# Patient Record
Sex: FEMALE | Race: WHITE | Employment: UNEMPLOYED | URBAN - METROPOLITAN AREA
[De-identification: names, ages, dates, MRNs, and addresses within clinical notes are randomized per-mention and may not be internally consistent; named-entity substitution may affect disease eponyms.]

---

## 2019-01-24 ENCOUNTER — OFFICE VISIT (OUTPATIENT)
Dept: FAMILY MEDICINE CLINIC | Facility: CLINIC | Age: 44
End: 2019-01-24
Payer: COMMERCIAL

## 2019-01-24 ENCOUNTER — APPOINTMENT (OUTPATIENT)
Dept: LAB | Facility: HOSPITAL | Age: 44
End: 2019-01-24
Attending: FAMILY MEDICINE
Payer: COMMERCIAL

## 2019-01-24 VITALS
SYSTOLIC BLOOD PRESSURE: 102 MMHG | DIASTOLIC BLOOD PRESSURE: 70 MMHG | BODY MASS INDEX: 23.38 KG/M2 | OXYGEN SATURATION: 97 % | HEART RATE: 68 BPM | HEIGHT: 71 IN | WEIGHT: 167 LBS

## 2019-01-24 DIAGNOSIS — R79.89 LOW VITAMIN D LEVEL: ICD-10-CM

## 2019-01-24 DIAGNOSIS — R39.15 URINARY URGENCY: ICD-10-CM

## 2019-01-24 DIAGNOSIS — R32 URINARY INCONTINENCE, UNSPECIFIED TYPE: ICD-10-CM

## 2019-01-24 DIAGNOSIS — E53.9 VITAMIN B DEFICIENCY: ICD-10-CM

## 2019-01-24 DIAGNOSIS — Z12.39 BREAST CANCER SCREENING: ICD-10-CM

## 2019-01-24 DIAGNOSIS — N94.3 PMS (PREMENSTRUAL SYNDROME): Primary | ICD-10-CM

## 2019-01-24 DIAGNOSIS — N94.3 PMS (PREMENSTRUAL SYNDROME): ICD-10-CM

## 2019-01-24 LAB
DHEA-S SERPL-MCNC: 473.7 UG/DL (ref 20–266)
VIT B12 SERPL-MCNC: 951 PG/ML (ref 181–914)

## 2019-01-24 PROCEDURE — 82627 DEHYDROEPIANDROSTERONE: CPT | Performed by: FAMILY MEDICINE

## 2019-01-24 PROCEDURE — 84207 ASSAY OF VITAMIN B-6: CPT

## 2019-01-24 PROCEDURE — 99204 OFFICE O/P NEW MOD 45 MIN: CPT | Performed by: FAMILY MEDICINE

## 2019-01-24 PROCEDURE — 86628 CANDIDA ANTIBODY: CPT

## 2019-01-24 PROCEDURE — 82306 VITAMIN D 25 HYDROXY: CPT

## 2019-01-24 PROCEDURE — 84140 ASSAY OF PREGNENOLONE: CPT

## 2019-01-24 PROCEDURE — 82607 VITAMIN B-12: CPT

## 2019-01-24 PROCEDURE — 36415 COLL VENOUS BLD VENIPUNCTURE: CPT

## 2019-01-24 RX ORDER — SPIRONOLACTONE 25 MG/1
TABLET ORAL
COMMUNITY
Start: 2019-01-08 | End: 2019-01-24

## 2019-01-24 RX ORDER — AMOXICILLIN 875 MG/1
TABLET, COATED ORAL
COMMUNITY
Start: 2019-01-16 | End: 2019-01-24

## 2019-01-24 RX ORDER — MIRABEGRON 50 MG/1
TABLET, FILM COATED, EXTENDED RELEASE ORAL
COMMUNITY
Start: 2019-01-13 | End: 2021-09-22

## 2019-01-24 RX ORDER — AZELASTINE HYDROCHLORIDE AND FLUTICASONE PROPIONATE 137; 50 UG/1; UG/1
SPRAY, METERED NASAL
COMMUNITY
Start: 2019-01-02 | End: 2021-07-28

## 2019-01-24 NOTE — PATIENT INSTRUCTIONS
The products and items listed below (the “Products”)  and their claims have not been evaluated by the Food and Drug Administration. Dietary products are not intended to treat, prevent, mitigate or cure disease.  Ultimately, you must draw your own conclusion and nervous system health, including memory. I recommend taking 2000 mg daily.   Some good brands are:  - at [address] stores: [employer] Udo Oil blend (vegan), Garden of Life   - online: [employer]  It can also be found in f

## 2019-01-24 NOTE — PROGRESS NOTES
Indio Walter is a 44year old female. Patient presents with:  Establish Care: supplements       HPI:     Was having very severe PMS symptoms. Was given supplements and spironolactone  Felt like the CBD oil was the most helpful.      Has a chronic h/o po Smokeless tobacco: Never Used    Substance and Sexual Activity      Alcohol use:  Yes        Alcohol/week: 0.6 oz        Types: 1 Glasses of wine per week      Drug use: No      Sexual activity: Not on file    Other Topics      Concerns:        Caffeine 3. Urinary incontinence, unspecified type    4. Breast cancer screening  - [facility] FANG 2D+3D SCREENING BILAT (CPT=77067/84026); Future    5. Low vitamin D level  - VITAMIN D, 25-HYDROXY; Future    6.  Vitamin B deficiency  - VITAMIN B6; Future  - VITAMIN B12; The products and items listed below (the “Products”)  and their claims have not been evaluated by the Food and Drug Administration. Dietary products are not intended to treat, prevent, mitigate or cure disease.  Ultimately, you must draw your own conclusion Omega 3 fatty acids are anti-inflammatory and important for brain and nervous system health, including memory. I recommend taking 2000 mg daily.   Some good brands are:  - at [employer] stores: [employer], [employer]'s Udo Oil blend (vegan), Garden of Life

## 2019-01-25 LAB — 25(OH)D3 SERPL-MCNC: 82.8 NG/ML (ref 30–100)

## 2019-01-26 LAB
CANDIDA ANTIBODY IGA: 0.4 EV
CANDIDA ANTIBODY IGG: 1 EV
CANDIDA ANTIBODY IGM: 0.6 EV

## 2019-01-27 LAB — VITAMIN B6: 381.3 NMOL/L

## 2019-01-29 LAB — PREGNENOLONE BY MS/MS, SERUM: 99 NG/DL

## 2019-04-29 ENCOUNTER — OFFICE VISIT (OUTPATIENT)
Dept: FAMILY MEDICINE CLINIC | Facility: CLINIC | Age: 44
End: 2019-04-29
Payer: COMMERCIAL

## 2019-04-29 VITALS
HEART RATE: 71 BPM | BODY MASS INDEX: 23.52 KG/M2 | OXYGEN SATURATION: 99 % | DIASTOLIC BLOOD PRESSURE: 72 MMHG | SYSTOLIC BLOOD PRESSURE: 118 MMHG | HEIGHT: 71 IN | WEIGHT: 168 LBS

## 2019-04-29 DIAGNOSIS — E53.9 VITAMIN B DEFICIENCY: ICD-10-CM

## 2019-04-29 DIAGNOSIS — R32 URINARY INCONTINENCE, UNSPECIFIED TYPE: ICD-10-CM

## 2019-04-29 DIAGNOSIS — Z00.00 ROUTINE MEDICAL EXAM: Primary | ICD-10-CM

## 2019-04-29 DIAGNOSIS — N94.3 PMS (PREMENSTRUAL SYNDROME): ICD-10-CM

## 2019-04-29 DIAGNOSIS — R79.89 LOW VITAMIN D LEVEL: ICD-10-CM

## 2019-04-29 PROCEDURE — 99396 PREV VISIT EST AGE 40-64: CPT | Performed by: FAMILY MEDICINE

## 2019-04-29 NOTE — PROGRESS NOTES
Jem Morin is a 44year old female. Patient presents with:  Physical: pap and breast exam       HPI:     Doing ok. Still having stress from the move they made. Bought supplements that were previously recommended. Taking CBD oil.    Stopped the spi History reviewed. No pertinent past medical history.     CURRENT MEDICATIONS:     Current Outpatient Medications:  DYMISTA 137-50 MCG/ACT Nasal Suspension  Disp:  Rfl:    MYRBETRIQ 50 MG Oral Tablet 24 Hr  Disp:  Rfl:        SOCIAL HISTORY:   Social History Past Surgical History:   Procedure Laterality Date   • Hip surgery  04/2017    Labral repair - Dr. Martínez Levels   • Knee arthroscopy  1996       PHYSICAL EXAM:      04/29/19  0908   BP: 118/72   Pulse: 71   SpO2: 99%   Weight: 168 lb   Height: 71\" No orders of the defined types were placed in this encounter. Patient Instructions     The products and items listed below (the “Products”)  and their claims have not been evaluated by the Food and Drug Administration.  Dietary products are not intende · Breast cancer screening/ mammograms and clinical breast exams  · Cervical cancer screening/ pap smears  · Healthy diet including adequate intake of vegetables and fruits, appropriate portion sizes, minimizing highly concentrated carbohydrate foods      P

## 2019-06-03 ENCOUNTER — TELEPHONE (OUTPATIENT)
Dept: FAMILY MEDICINE CLINIC | Facility: CLINIC | Age: 44
End: 2019-06-03

## 2019-06-03 DIAGNOSIS — R79.89 LOW VITAMIN D LEVEL: Primary | ICD-10-CM

## 2019-06-03 NOTE — TELEPHONE ENCOUNTER
----- Message from Kaley Jackson. Jimmy Munoz sent at 6/3/2019  1:54 PM CDT -----  Regarding: Referral Request  Contact: [phone]  I'm in need of refurbishing my orthotics I wear for workout.  I called the place that made them and they told me to go thru insurance

## 2019-06-04 NOTE — TELEPHONE ENCOUNTER
LM for pt that referral is ready and can be mailed, faxed or picked up by pt. Encouraged pt to contact the office to let us know.

## 2019-08-27 ENCOUNTER — HOSPITAL ENCOUNTER (OUTPATIENT)
Dept: MAMMOGRAPHY | Facility: HOSPITAL | Age: 44
Discharge: HOME | End: 2019-08-27
Attending: FAMILY MEDICINE
Payer: COMMERCIAL

## 2019-08-27 DIAGNOSIS — Z12.39 BREAST CANCER SCREENING: ICD-10-CM

## 2019-08-27 PROCEDURE — 77067 SCR MAMMO BI INCL CAD: CPT | Performed by: FAMILY MEDICINE

## 2019-08-27 PROCEDURE — 77063 BREAST TOMOSYNTHESIS BI: CPT | Performed by: FAMILY MEDICINE

## 2019-12-03 ENCOUNTER — OFFICE VISIT (OUTPATIENT)
Dept: INTEGRATIVE MEDICINE | Facility: CLINIC | Age: 44
End: 2019-12-03
Payer: COMMERCIAL

## 2019-12-03 ENCOUNTER — TELEPHONE (OUTPATIENT)
Dept: INTEGRATIVE MEDICINE | Facility: CLINIC | Age: 44
End: 2019-12-03

## 2019-12-03 VITALS
BODY MASS INDEX: 19.71 KG/M2 | HEIGHT: 71 IN | OXYGEN SATURATION: 91 % | SYSTOLIC BLOOD PRESSURE: 122 MMHG | WEIGHT: 140.81 LBS | HEART RATE: 73 BPM | DIASTOLIC BLOOD PRESSURE: 84 MMHG | TEMPERATURE: 98 F

## 2019-12-03 DIAGNOSIS — R35.0 URINARY FREQUENCY: ICD-10-CM

## 2019-12-03 DIAGNOSIS — R30.9 URINARY PAIN: Primary | ICD-10-CM

## 2019-12-03 DIAGNOSIS — M79.602 PAIN OF LEFT UPPER EXTREMITY: ICD-10-CM

## 2019-12-03 DIAGNOSIS — R30.9 URINARY PAIN: ICD-10-CM

## 2019-12-03 PROCEDURE — 87086 URINE CULTURE/COLONY COUNT: CPT | Performed by: FAMILY MEDICINE

## 2019-12-03 PROCEDURE — 99213 OFFICE O/P EST LOW 20 MIN: CPT | Performed by: PHYSICIAN ASSISTANT

## 2019-12-03 PROCEDURE — 87186 SC STD MICRODIL/AGAR DIL: CPT | Performed by: FAMILY MEDICINE

## 2019-12-03 PROCEDURE — 87077 CULTURE AEROBIC IDENTIFY: CPT | Performed by: FAMILY MEDICINE

## 2019-12-03 PROCEDURE — 81002 URINALYSIS NONAUTO W/O SCOPE: CPT | Performed by: PHYSICIAN ASSISTANT

## 2019-12-03 RX ORDER — NITROFURANTOIN 25; 75 MG/1; MG/1
100 CAPSULE ORAL 2 TIMES DAILY
Qty: 16 CAPSULE | Refills: 0 | Status: SHIPPED | OUTPATIENT
Start: 2019-12-03 | End: 2019-12-03

## 2019-12-03 RX ORDER — NITROFURANTOIN 25; 75 MG/1; MG/1
100 CAPSULE ORAL 2 TIMES DAILY
Qty: 16 CAPSULE | Refills: 0 | Status: SHIPPED | OUTPATIENT
Start: 2019-12-03 | End: 2019-12-11

## 2019-12-03 NOTE — TELEPHONE ENCOUNTER
Please call in prescription for patient's UTI to [facility] #[phone] per patient request - Their current system is down and the patient's prescription will not be ready for several hours.  Thank you

## 2019-12-03 NOTE — PATIENT INSTRUCTIONS
Find at [employer]   Take according to bottle instructions. Drinks lots of water. Take 1 capsule twice a day while on antibiotics to help prevent side effects. Find at pharmacy.

## 2019-12-03 NOTE — PROGRESS NOTES
Ca Trevino is a 38year old female. Patient presents with:  Abdominal Pain: lower abdominal pain   Urinary Symptoms: blood in urine, frequent urination       HPI:   crampy and pressure. Period ended yesterday. Urine is pink. Painful.  Never had a UTI Socioeconomic History      Marital status:       Spouse name: Not on file      Number of children: Not on file      Years of education: Not on file      Highest education level: Not on file    Occupational History      Not on file    Social Needs Weight: 140 lb 12.8 oz (63.9 kg)   Height: 71\"       Physical Exam   Constitutional: She is oriented to person, place, and time and well-developed, well-nourished, and in no distress. Eyes: Pupils are equal, round, and reactive to light.  Conjunctivae ar Orders Placed This Encounter      POC Urinalysis, Manual Dip without microscopy [[id number]]      Urinalysis with Culture Reflex [E]      Patient Instructions         Find at [employer]   Take according to bottle instructions. Drinks lots of water.

## 2019-12-04 ENCOUNTER — TELEPHONE (OUTPATIENT)
Dept: INTEGRATIVE MEDICINE | Facility: CLINIC | Age: 44
End: 2019-12-04

## 2019-12-04 DIAGNOSIS — R35.0 URINARY FREQUENCY: Primary | ICD-10-CM

## 2019-12-09 NOTE — PROGRESS NOTES
Yuan Roberto,    The urine culture came back positive for infection with E. Coli. The antibiotic that I gave you should work for this.      Frankey Sickle, PA-C

## 2019-12-19 ENCOUNTER — OFFICE VISIT (OUTPATIENT)
Dept: INTEGRATIVE MEDICINE | Facility: CLINIC | Age: 44
End: 2019-12-19
Payer: COMMERCIAL

## 2019-12-19 VITALS
DIASTOLIC BLOOD PRESSURE: 84 MMHG | BODY MASS INDEX: 20 KG/M2 | HEIGHT: 71 IN | SYSTOLIC BLOOD PRESSURE: 122 MMHG | HEART RATE: 85 BPM | TEMPERATURE: 98 F | OXYGEN SATURATION: 98 %

## 2019-12-19 DIAGNOSIS — N89.8 VAGINAL DISCHARGE: Primary | ICD-10-CM

## 2019-12-19 DIAGNOSIS — R30.9 PAINFUL URINATION: ICD-10-CM

## 2019-12-19 DIAGNOSIS — R10.2 VAGINAL PAIN: ICD-10-CM

## 2019-12-19 PROCEDURE — 87205 SMEAR GRAM STAIN: CPT | Performed by: PHYSICIAN ASSISTANT

## 2019-12-19 PROCEDURE — 87106 FUNGI IDENTIFICATION YEAST: CPT | Performed by: PHYSICIAN ASSISTANT

## 2019-12-19 PROCEDURE — 87808 TRICHOMONAS ASSAY W/OPTIC: CPT | Performed by: PHYSICIAN ASSISTANT

## 2019-12-19 PROCEDURE — 81002 URINALYSIS NONAUTO W/O SCOPE: CPT | Performed by: PHYSICIAN ASSISTANT

## 2019-12-19 PROCEDURE — 99213 OFFICE O/P EST LOW 20 MIN: CPT | Performed by: PHYSICIAN ASSISTANT

## 2019-12-19 NOTE — PROGRESS NOTES
Bradley Ramirez is a 38year old female. Patient presents with: Follow - Up: uti f/u ,vaginal exam   Cramps: x 3 days    Vaginal Problem: vaginal pressure       HPI:   Feels crampy and has vaginal pressure. Some tingling. Has had BV before.  A little vag Social Needs      Financial resource strain: Not on file      Food insecurity:        Worry: Not on file        Inability: Not on file      Transportation needs:        Medical: Not on file        Non-medical: Not on file    Tobacco Use      Smoking status Pulmonary/Chest: Effort normal and breath sounds normal.   Abdominal: Soft. Bowel sounds are normal. There is no tenderness. There is no guarding. Genitourinary:    Cervix normal.      Vaginal discharge present.      Neurological: She is alert and orien

## 2019-12-22 NOTE — PROGRESS NOTES
Hi Kayleen Dose,     Your vaginosis screen is not completely back yet but so far it is looking like it is negative and no treatment is needed. Let us know if you at still having issues.      Lee Ann Musa PA-C

## 2020-03-17 ENCOUNTER — PATIENT MESSAGE (OUTPATIENT)
Dept: INTEGRATIVE MEDICINE | Facility: CLINIC | Age: 45
End: 2020-03-17

## 2020-03-17 RX ORDER — AMOXICILLIN AND CLAVULANATE POTASSIUM 875; 125 MG/1; MG/1
1 TABLET, FILM COATED ORAL 2 TIMES DAILY
Qty: 14 TABLET | Refills: 0 | Status: SHIPPED | OUTPATIENT
Start: 2020-03-17 | End: 2020-03-24

## 2020-03-17 NOTE — TELEPHONE ENCOUNTER
From: Shira Horne  To: Ezekiel Richardson DO  Sent: 3/17/2020 10:58 AM CDT  Subject: Non-Urgent Medical Question    Good morning,   I'm having a lot of pressure in my face/teeth from a sinus infection I seem to get once or twice a year.  Would it be possible

## 2020-10-30 ENCOUNTER — PATIENT MESSAGE (OUTPATIENT)
Dept: INTEGRATIVE MEDICINE | Facility: CLINIC | Age: 45
End: 2020-10-30

## 2020-10-30 DIAGNOSIS — Z12.31 ENCOUNTER FOR SCREENING MAMMOGRAM FOR MALIGNANT NEOPLASM OF BREAST: Primary | ICD-10-CM

## 2020-10-30 NOTE — TELEPHONE ENCOUNTER
From: Tiffany Molina  To: Maciel Coffman DO  Sent: 10/30/2020 12:58 PM CDT  Subject: Non-Urgent Medical Question    I booked my mammogram online with [address]. They just called because I don't have an order.  I thought a law was passed that women can

## 2020-10-30 NOTE — TELEPHONE ENCOUNTER
RN has pended mammo for patient who has scheduled, last order was  and was asking for additional views, I did not know how to add that in, please check my pended order. Thank you.

## 2020-12-01 ENCOUNTER — HOSPITAL ENCOUNTER (OUTPATIENT)
Dept: MAMMOGRAPHY | Facility: HOSPITAL | Age: 45
Discharge: HOME | End: 2020-12-01
Attending: FAMILY MEDICINE
Payer: COMMERCIAL

## 2020-12-01 DIAGNOSIS — Z12.31 ENCOUNTER FOR SCREENING MAMMOGRAM FOR MALIGNANT NEOPLASM OF BREAST: ICD-10-CM

## 2020-12-01 PROCEDURE — 77067 SCR MAMMO BI INCL CAD: CPT | Performed by: FAMILY MEDICINE

## 2020-12-01 PROCEDURE — 77063 BREAST TOMOSYNTHESIS BI: CPT | Performed by: FAMILY MEDICINE

## 2021-01-25 ENCOUNTER — TELEMEDICINE (OUTPATIENT)
Dept: FAMILY MEDICINE CLINIC | Facility: CLINIC | Age: 46
End: 2021-01-25

## 2021-01-25 DIAGNOSIS — Z20.822 EXPOSURE TO COVID-19 VIRUS: Primary | ICD-10-CM

## 2021-01-25 PROCEDURE — 99213 OFFICE O/P EST LOW 20 MIN: CPT | Performed by: NURSE PRACTITIONER

## 2021-01-25 NOTE — PROGRESS NOTES
Due to the real risk of possible exposure to Coronavirus (CoV-2, COVID-19) in the office/medical building and recommendations for social distancing (key to mitigation/limiting the spread of the virus) a Virtual or Telemedicine visit over the phone was perf above.  Coding/billing information is submitted for this visit based on complexity of care and/or time spent for the visit. HPI:  Patient presents with:   Follow - Up: COVID exposure, negative test      Bradley Ramirez is a 41year old female who calls fo COVID PCR 1/23/21. Additional Assessment and Plan:  1.  Exposure to COVID-19 virus  -Discussed CDC quarantine guidance with patient, which was updated 12/2020 to reflect that if a patient has been monitoring for symptoms daily and has had a negative PCR

## 2021-01-25 NOTE — PATIENT INSTRUCTIONS
Coronavirus Disease 2019 (COVID-19)     [facility] is committed to the safety and well-being of our patients, members, employees, and communities.  As concerns arise about the new strain of coronavirus that causes COVID-19, [facility] exposure  • After day 7 from date of last exposure with a negative test result (test must occur on day 5 or later)  After stopping quarantine, you should  • Watch for symptoms until 14 days after exposure.   • If you have symptoms, immediately self-isolate Care     If you are awaiting test results or are confirmed positive for COVID -19, and your symptoms worsen at home with symptoms such as: extreme weakness, difficult breathing, or unrelenting fevers greater than 100.4 degrees Fahrenheit, you should contac Follow-up  Please call your primary care provider within 2 days of your discharge to arrange for a telehealth follow-up.  CDC does not recommend repeat testing after a positive test.  Convalescent Plasma Donation Program  Zohra Salter, in conjunct [email or web address]. pdf  [email or web address]  [email or web address]

## 2021-08-02 ENCOUNTER — MED REC SCAN ONLY (OUTPATIENT)
Dept: INTEGRATIVE MEDICINE | Facility: CLINIC | Age: 46
End: 2021-08-02

## 2021-08-24 NOTE — TELEPHONE ENCOUNTER
A refill request was received for:  Requested Prescriptions     Pending Prescriptions Disp Refills   • AZELASTINE-FLUTICASONE 137-50 MCG/ACT Nasal Suspension [Pharmacy Med Name: AZELASTIN-FLUTIC 137-50MCG [facility]]  0     Si SPRAY BY NASAL ROUTE 2 (TWO) BEATRIZ

## 2021-08-25 RX ORDER — AZELASTINE HYDROCHLORIDE, FLUTICASONE PROPIONATE 137; 50 UG/1; UG/1
1 SPRAY, METERED NASAL 2 TIMES DAILY
Qty: 23 G | Refills: 1 | Status: SHIPPED | OUTPATIENT
Start: 2021-08-25 | End: 2021-11-29

## 2021-09-22 ENCOUNTER — LAB ENCOUNTER (OUTPATIENT)
Dept: LAB | Facility: HOSPITAL | Age: 46
End: 2021-09-22
Attending: CLINICAL NURSE SPECIALIST
Payer: COMMERCIAL

## 2021-09-22 ENCOUNTER — OFFICE VISIT (OUTPATIENT)
Dept: OBGYN CLINIC | Facility: CLINIC | Age: 46
End: 2021-09-22
Payer: COMMERCIAL

## 2021-09-22 VITALS
SYSTOLIC BLOOD PRESSURE: 120 MMHG | WEIGHT: 180 LBS | HEART RATE: 67 BPM | DIASTOLIC BLOOD PRESSURE: 77 MMHG | BODY MASS INDEX: 25 KG/M2

## 2021-09-22 DIAGNOSIS — Z30.09 ENCOUNTER FOR COUNSELING REGARDING CONTRACEPTION: ICD-10-CM

## 2021-09-22 DIAGNOSIS — N92.0 MENORRHAGIA WITH REGULAR CYCLE: Primary | ICD-10-CM

## 2021-09-22 LAB
BASOPHILS # BLD AUTO: 0.04 X10(3) UL (ref 0–0.2)
BASOPHILS NFR BLD AUTO: 0.9 %
DEPRECATED RDW RBC AUTO: 38.8 FL (ref 35.1–46.3)
EOSINOPHIL # BLD AUTO: 0.08 X10(3) UL (ref 0–0.7)
EOSINOPHIL NFR BLD AUTO: 1.7 %
ERYTHROCYTE [DISTWIDTH] IN BLOOD BY AUTOMATED COUNT: 11.4 % (ref 11–15)
HCT VFR BLD AUTO: 37.5 %
HGB BLD-MCNC: 13.3 G/DL
IMM GRANULOCYTES # BLD AUTO: 0.03 X10(3) UL (ref 0–1)
IMM GRANULOCYTES NFR BLD: 0.6 %
LYMPHOCYTES # BLD AUTO: 1.45 X10(3) UL (ref 1–4)
LYMPHOCYTES NFR BLD AUTO: 31 %
MCH RBC QN AUTO: 32.7 PG (ref 26–34)
MCHC RBC AUTO-ENTMCNC: 35.5 G/DL (ref 31–37)
MCV RBC AUTO: 92.1 FL
MONOCYTES # BLD AUTO: 0.32 X10(3) UL (ref 0.1–1)
MONOCYTES NFR BLD AUTO: 6.8 %
NEUTROPHILS # BLD AUTO: 2.76 X10 (3) UL (ref 1.5–7.7)
NEUTROPHILS # BLD AUTO: 2.76 X10(3) UL (ref 1.5–7.7)
NEUTROPHILS NFR BLD AUTO: 59 %
PLATELET # BLD AUTO: 245 10(3)UL (ref 150–450)
RBC # BLD AUTO: 4.07 X10(6)UL
WBC # BLD AUTO: 4.7 X10(3) UL (ref 4–11)

## 2021-09-22 PROCEDURE — 85025 COMPLETE CBC W/AUTO DIFF WBC: CPT | Performed by: CLINICAL NURSE SPECIALIST

## 2021-09-22 PROCEDURE — 3078F DIAST BP <80 MM HG: CPT | Performed by: CLINICAL NURSE SPECIALIST

## 2021-09-22 PROCEDURE — 3074F SYST BP LT 130 MM HG: CPT | Performed by: CLINICAL NURSE SPECIALIST

## 2021-09-22 PROCEDURE — 99203 OFFICE O/P NEW LOW 30 MIN: CPT | Performed by: CLINICAL NURSE SPECIALIST

## 2021-09-22 PROCEDURE — 36415 COLL VENOUS BLD VENIPUNCTURE: CPT | Performed by: CLINICAL NURSE SPECIALIST

## 2021-09-23 NOTE — PROGRESS NOTES
Alba Potter is a 40year old female [id number] Patient's last menstrual period was 09/12/2021. Patient presents with:  Consult: Would like to discuss IUD for heavy menses   New pt. Recently moved back after 3 years away.  Last pap and annual exam was about Types: 1 Glasses of wine per week    Drug use: No         SOCIAL HISTORY:  Social History    Socioeconomic History      Marital status:       Spouse name: Not on file      Number of children: Not on file      Years of education: Not on file      Hi   Emotionally Abused: Not on file      Physically Abused: Not on file      Sexually Abused: Not on file  Housing Stability:       Unable to Pay for Housing in the Last Year: Not on file      Number of Places Lived in the Last Year: Not on file      Unstabl infection, expulsion and ectopic) as well as side effects (irregular bleeding for the first few months, followed by possibility of irregular but very light spotting or amenorrhea). After US, pt will call with period to schedule insertion.  Rx for Cytotec

## 2021-10-28 ENCOUNTER — MED REC SCAN ONLY (OUTPATIENT)
Dept: INTEGRATIVE MEDICINE | Facility: CLINIC | Age: 46
End: 2021-10-28

## 2021-11-08 ENCOUNTER — OFFICE VISIT (OUTPATIENT)
Dept: INTEGRATIVE MEDICINE | Facility: CLINIC | Age: 46
End: 2021-11-08
Payer: COMMERCIAL

## 2021-11-08 VITALS
HEART RATE: 66 BPM | DIASTOLIC BLOOD PRESSURE: 70 MMHG | BODY MASS INDEX: 25 KG/M2 | HEIGHT: 71 IN | SYSTOLIC BLOOD PRESSURE: 108 MMHG | OXYGEN SATURATION: 96 %

## 2021-11-08 DIAGNOSIS — R35.0 URINARY FREQUENCY: Primary | ICD-10-CM

## 2021-11-08 DIAGNOSIS — Z12.11 COLON CANCER SCREENING: ICD-10-CM

## 2021-11-08 DIAGNOSIS — Z12.31 BREAST CANCER SCREENING BY MAMMOGRAM: ICD-10-CM

## 2021-11-08 PROCEDURE — 3078F DIAST BP <80 MM HG: CPT | Performed by: FAMILY MEDICINE

## 2021-11-08 PROCEDURE — 3074F SYST BP LT 130 MM HG: CPT | Performed by: FAMILY MEDICINE

## 2021-11-08 PROCEDURE — 3008F BODY MASS INDEX DOCD: CPT | Performed by: FAMILY MEDICINE

## 2021-11-08 PROCEDURE — 99214 OFFICE O/P EST MOD 30 MIN: CPT | Performed by: FAMILY MEDICINE

## 2021-11-08 NOTE — PROGRESS NOTES
Christofer Leo is a 40year old female. Patient presents with: Follow - Up: pt had physical elsewhere in march   Breast Follow-up: breast exam       HPI:     Received COVID vaccine in March 2021. Then had several heavy periods.      Still urinating quite Stress Concern: Not Asked        Weight Concern: Not Asked    Social History Narrative      Not on file    Social Determinants of Health  Financial Resource Strain:       Difficulty of Paying Living Expenses: Not on file  Food Insecurity:       Worried Ab Cardiovascular:      Rate and Rhythm: Normal rate. Pulmonary:      Effort: Pulmonary effort is normal.   Musculoskeletal:      Cervical back: Neck supple. Skin:     General: Skin is warm and dry.    Neurological:      Mental Status: She is alert and o Colon cancer screening    Labs from executive physical reviewed. Cologuard ordered for colon cancer screening. Patient given mammogram order to screen for breast cancer. Patient was referred for pelvic floor therapy for further management of symptoms. each to a liter bottle of water to be sipped throughout the day on Days 4-7. Continue taking until all the bottles are complete. Where to Find: ONLY online at [email or web address]. [employer]        Return if symptoms worsen or fail to improve.     Glo

## 2021-11-08 NOTE — PATIENT INSTRUCTIONS
I have complete keke in the body's ability to heal and transform. The products and items listed below (the “Products”)  and their claims have not been evaluated by the Food and Drug Administration.  Dietary products are not intended to treat, prevent, m

## 2021-11-09 ENCOUNTER — TELEPHONE (OUTPATIENT)
Dept: INTEGRATIVE MEDICINE | Facility: CLINIC | Age: 46
End: 2021-11-09

## 2021-11-09 NOTE — TELEPHONE ENCOUNTER
----- Message from Hammad Manzo DO sent at 11/8/2021  1:58 PM CST -----  Regarding: Cologuard  Please order Cologuard for patient.

## 2021-11-23 LAB — AMB EXT COLOGUARD RESULT: NEGATIVE

## 2021-11-29 RX ORDER — AZELASTINE HYDROCHLORIDE, FLUTICASONE PROPIONATE 137; 50 UG/1; UG/1
1 SPRAY, METERED NASAL 2 TIMES DAILY
Qty: 23 G | Refills: 1 | Status: SHIPPED | OUTPATIENT
Start: 2021-11-29 | End: 2021-12-06

## 2021-11-29 NOTE — TELEPHONE ENCOUNTER
A refill request was received for:  Requested Prescriptions     Pending Prescriptions Disp Refills   • AZELASTINE-FLUTICASONE 137-50 MCG/ACT Nasal Suspension [Pharmacy Med Name: AZELASTIN-FLUTIC 137-50MCG [facility]]  1     Si SPRAY BY NASAL ROUTE 2 (TWO) BEATRIZ

## 2021-12-08 NOTE — TELEPHONE ENCOUNTER
Rcvd results from [facility] for pt's Cologuard testing: Negative  Report reviewed with Dr. Anu Jasso. Results entered into External Result Console. Sent to scanning. Pt notified via [address]

## 2022-01-19 ENCOUNTER — HOSPITAL ENCOUNTER (OUTPATIENT)
Dept: MAMMOGRAPHY | Facility: HOSPITAL | Age: 47
Discharge: HOME | End: 2022-01-19
Attending: FAMILY MEDICINE
Payer: COMMERCIAL

## 2022-01-19 DIAGNOSIS — Z12.31 BREAST CANCER SCREENING BY MAMMOGRAM: ICD-10-CM

## 2022-01-19 PROCEDURE — 77063 BREAST TOMOSYNTHESIS BI: CPT | Performed by: FAMILY MEDICINE

## 2022-01-19 PROCEDURE — 77067 SCR MAMMO BI INCL CAD: CPT | Performed by: FAMILY MEDICINE

## 2022-03-10 ENCOUNTER — APPOINTMENT (OUTPATIENT)
Dept: PHYSICAL THERAPY | Age: 47
End: 2022-03-10
Attending: FAMILY MEDICINE
Payer: COMMERCIAL

## 2022-03-14 ENCOUNTER — TELEPHONE (OUTPATIENT)
Dept: PHYSICAL THERAPY | Facility: HOSPITAL | Age: 47
End: 2022-03-14

## 2022-03-16 ENCOUNTER — OFFICE VISIT (OUTPATIENT)
Dept: PHYSICAL THERAPY | Age: 47
End: 2022-03-16
Attending: FAMILY MEDICINE
Payer: COMMERCIAL

## 2022-03-16 DIAGNOSIS — R35.0 URINARY FREQUENCY: ICD-10-CM

## 2022-03-16 PROCEDURE — 97112 NEUROMUSCULAR REEDUCATION: CPT

## 2022-03-16 PROCEDURE — 97110 THERAPEUTIC EXERCISES: CPT

## 2022-03-16 PROCEDURE — 97161 PT EVAL LOW COMPLEX 20 MIN: CPT

## 2022-03-16 NOTE — PROGRESS NOTES
PELVIC FLOOR EVALUATION:   Referring Physician: Dr. Joey Davison  Diagnosis:    Urinary frequency (R35.0)  Date of onset:  chronic Date of Service: 3/16/2022     PATIENT SUMMARY   Iwona Aragon is a 40year old female  who presents to therapy today with complaints of urgency and leakage. Current symptoms include: urgency/frequency and leakage  History of condition: Patient reports she has had bladder issues her whole life. She was a \"bed wetter\" until she was 16 y/o. She delivered her first child 19 years ago. Afterwards she had more urinary issues and saw a pelvic therapist. It helped her symptoms. She had 2 more children. She also had torn hip labrum and repairs about 5 years ago. She is able to jump, run, cough, and sneeze without leakage. She c/o urgency and has to rush to the washroom (mostly when walking dog). She started therapy about 6 months prior. She is finding that her bladder control is getting better. She had to travel to [facility] and was able to hold her bladder for 5 hours. She leaks about once every couple of weeks. Nocturia of 3x per night. She feels like she wakes more to habit. She denies any bowel issues. She does have R hip pain which she is currently being treated for. Obstetrical/Gynecological history:  Pregnant Now: No   3 /Para 3 (66,70,46)  Complications during pregnancy/delivery: Vaginal   Last menstrual period: one week prior     Surgical history:R hip labral repairs, L knee surgery  Urodynamic Test: no  Occupation/Activities: not working. PFDI 20    Scores   POPDI 6:   [id number]    CRAD 8:   6.25    CHONG 6:   37.5    Summary:   [id number]         Mely Tyler describes prior level of function: no limitations  Pt goals include improve bladder control . Past medical history was reviewed with Mely Tyler.  Significant findings include: None    Precautions:  None    URINARY HABITS  Types of symptoms: urge incontinence and nocturia  Events associated with the onset of urinary complaints: delivery   Abdominal/Vaginal Pressure complaints: no  Urinary Frequency:every 3 hours  Urine Stream: normal  Amount: normal   Leaking occurs:  urge  Episodes of Leakage: 1x every 2-3 weeks  Amount of leakage: gush  Pad use: no  Pad Change frequency: na  Nocturia: 3x  Fluid Intake: coffee (1/2 pot), water: 4 x 8oz bottles, no alcohol. Bladder irritants: coffee  Urine Stop test: yes  Post void dribble: No  Hovering: No  Empty bladder just in case: Yes  Do you ever leak urine without knowing it? No    BOWEL HABITS  Types of symptoms: none   Frequency of bowel movements: daily   Stool consistency: Castro Stool Scale: 4  Do you strain with defecation: No   Laxative/Stool softener use: No    SEXUAL HEALTH STATUS  History of Sexual Abuse: no  Sexual Aneth Status: participating  Pain with initial and/or deep penetration: no/no  Pain with pelvic exam/tampon use: no/No  Orgasm status: gayle Snyder presents to physical therapy evaluation with primary c/o urgency and leakage of urine. Patient reports long standing history of urgency. She has had PT in the past with good results. Symptoms increased about 5 years prior. She also has PMH of R labral tear and repair. She is currently in PT for hip pain which has also helped her bladder control. She denies any bowel issues. She denies any pelvic pain. The results of the objective tests and measures show TTP in layer 2,3 of R PF,  Decreased relaxation of PF, decreased ROM in PF, and decreased strength and endurance in PF. Functional deficits include but are not limited to urgency and leakage of urine. Signs and symptoms are consistent with diagnosis of urinary frequency. Pt and PT discussed evaluation findings, pathology, POC and HEP. Pt voiced understanding and performs HEP correctly without reported pain. Skilled Pelvic Physical Therapy is medically necessary to address the above impairments and reach functional goals.     OBJECTIVE: Range Of Motion  Lumbar AROM screen: WFL  LE AROM screen: grossly WNL except: WFL    Strength (MMT) 5/5 STEPHANIE LE except hip ext 4/5  Transverse Abdominis: 4/5    Flexibility Summary: WNL STEPHANIE LE except hamstring, piriformis, OI    Special Tests: Pelvis WFL    Functional screen:  Double leg squat:WFL  Single leg squat:WFL  Single leg stance: [facility]    Informed consent for internal pelvic evaluation given: Yes    Internal Examination   Mons pubis: WNL  Labia majora: WNL  Labia minora: WNL  Urethral meatus: WNL  Introitus: WNL  Perineal body: WNL    Internal Palpation Left Right Comments   Superficial Transverse perineal moderate restriction moderate restriction and tenderness    Bulbocavernosus moderate restriction moderate restriction and tenderness    Ischiocavernosus moderate restriction moderate restriction and tenderness    Deep Transverse Perineal moderate restriction moderate restriction and tenderness    Compress Urethrae/Spinc. Urethrovaginalis   moderate restriction moderate restriction and tenderness    Pubococcygeus/Pubovaginalis moderate restriction and tenderness moderate restriction and tenderness    Iliococcygeus moderate restriction and tenderness moderate restriction and tenderness    Coccygeus moderate restriction and pain moderate restriction and pain    Piriformis not tested not tested    Obturator internus moderate restriction and tenderness moderate restriction and pain         Pelvic Floor Muscle strength: (PERF= Power/Endurance/Reps/Fast) MMT: 3/3/4/7  External Anal Sphincter: WFL  Accessory Muscle Use: gluteals    Diastasis Recti: not tested    Tissue Laxity Test:  Anterior Wall: Min  Posterior Wall: Min  Apical: Min      Today's Treatment and Response:   Patient education was provided on objective findings of external and internal evaluation and expectations with treatment outcomes.  Educated on pelvic anatomy and function with diagrams and pelvic model, bladder normatives, adequate hydration levels, proper toileting posture, stress/urge urinary incontinence strategies and diaphragmatic breathing for PNS activation and pelvic floor relaxation     Patient was instructed in and issued a HEP for: diaphragmatic breathing, happy baby, urge inhibition    Charges: PT Andrade Low Complexity, 1TE, 1NM      Total Timed Treatment: 30 min     Total Treatment Time: 45 min     Based on clinical rationale and outcome measures, this evaluation involved Low Complexity decision making   PLAN OF CARE:    Goals: (to be met in 10 visits)  Patient to report urinary frequency to every 2-4 hours to allow for independent ADLs  Patient instructed on Levator Ani contraction inverse to diaphragmatic breathing to allow for pelvic brace with ADLs without valsalva. Patient exhibits an increase in pelvic floor strength from 3/5 with 3 count hold to 4/5  with 10 count hold to allow for pelvic brace with ADLs. Patient reports a reduction in UUI from 1x/ week to 0x week     Patient understands importance of performing HEP to prevent reoccurrence of symptoms. Frequency / Duration: Patient will be seen for 1 x/week or a total of 10 visits over a 90 day period. Treatment will include: Manual Therapy, Neuromuscular Re-education, Self-Care Home Management, Therapeutic Activities, Therapeutic Exercise and Home Exercise Program instruction     Education or treatment limitation: None  Rehab Potential:good      Patient/Family/Caregiver was advised of these findings, precautions, and treatment options and has agreed to actively participate in planning and for this course of care. Thank you for your referral. Please co-sign or sign and return this letter via fax as soon as possible to [phone].  If you have any questions, please contact me at Dept: [phone]    Sincerely,  Electronically signed by therapist: Chris Robert, PT  [de-identified] certification required: Yes  I certify the need for these services furnished under this plan of treatment and while under my care.     X___________________________________________________ Date____________________    Certification From: 5/80/5274  To:6/14/2022

## 2022-03-22 NOTE — TELEPHONE ENCOUNTER
Received fax from pt's pharmacy, [facility], requesting a 90-day supply of Flonase - last Rx refill was 03/08/22. Pt informed via [employer] she needs to schedule her annual phx prior to refill.

## 2022-03-23 ENCOUNTER — OFFICE VISIT (OUTPATIENT)
Dept: PHYSICAL THERAPY | Age: 47
End: 2022-03-23
Attending: FAMILY MEDICINE
Payer: COMMERCIAL

## 2022-03-23 PROCEDURE — 97112 NEUROMUSCULAR REEDUCATION: CPT

## 2022-03-23 PROCEDURE — 97140 MANUAL THERAPY 1/> REGIONS: CPT

## 2022-03-24 RX ORDER — FLUTICASONE PROPIONATE 50 MCG
2 SPRAY, SUSPENSION (ML) NASAL DAILY
Qty: 48 G | Refills: 1 | Status: SHIPPED | OUTPATIENT
Start: 2022-03-24 | End: 2023-03-19

## 2022-04-04 ENCOUNTER — PATIENT MESSAGE (OUTPATIENT)
Dept: OBGYN CLINIC | Facility: CLINIC | Age: 47
End: 2022-04-04

## 2022-04-04 NOTE — TELEPHONE ENCOUNTER
From: Ottoniel Hook  To: JOSE Nieto  Sent: 2022 2:36 PM CDT  Subject: Wednesday cancel    Era Oly, I have a  to attend Wednesday and will miss our appt. I will see you the following week. Thank you.

## 2022-04-06 ENCOUNTER — APPOINTMENT (OUTPATIENT)
Dept: PHYSICAL THERAPY | Age: 47
End: 2022-04-06
Attending: FAMILY MEDICINE
Payer: COMMERCIAL

## 2022-04-13 ENCOUNTER — APPOINTMENT (OUTPATIENT)
Dept: PHYSICAL THERAPY | Age: 47
End: 2022-04-13
Attending: FAMILY MEDICINE
Payer: COMMERCIAL

## 2022-04-14 RX ORDER — AZELASTINE 1 MG/ML
SPRAY, METERED NASAL
Qty: 30 ML | Refills: 1 | Status: SHIPPED | OUTPATIENT
Start: 2022-04-14

## 2022-04-18 ENCOUNTER — TELEPHONE (OUTPATIENT)
Dept: INTEGRATIVE MEDICINE | Facility: CLINIC | Age: 47
End: 2022-04-18

## 2022-04-18 NOTE — TELEPHONE ENCOUNTER
Received fax from pt's pharmacy stating that Azelastine 0.1% is unavailable/back ordered - OK for pharmacy to change to Azelastine 0.15% Nasal Spray?     LOV 11.08.21  Upcoming visit 05.26.22

## 2022-04-20 ENCOUNTER — APPOINTMENT (OUTPATIENT)
Dept: PHYSICAL THERAPY | Age: 47
End: 2022-04-20
Attending: FAMILY MEDICINE
Payer: COMMERCIAL

## 2022-04-27 ENCOUNTER — APPOINTMENT (OUTPATIENT)
Dept: PHYSICAL THERAPY | Age: 47
End: 2022-04-27
Attending: FAMILY MEDICINE
Payer: COMMERCIAL

## 2022-05-04 ENCOUNTER — APPOINTMENT (OUTPATIENT)
Dept: PHYSICAL THERAPY | Age: 47
End: 2022-05-04
Attending: FAMILY MEDICINE
Payer: COMMERCIAL

## 2022-05-11 ENCOUNTER — APPOINTMENT (OUTPATIENT)
Dept: PHYSICAL THERAPY | Age: 47
End: 2022-05-11
Attending: FAMILY MEDICINE
Payer: COMMERCIAL

## 2022-05-18 ENCOUNTER — APPOINTMENT (OUTPATIENT)
Dept: PHYSICAL THERAPY | Age: 47
End: 2022-05-18
Attending: FAMILY MEDICINE
Payer: COMMERCIAL

## 2022-06-13 ENCOUNTER — LABORATORY ENCOUNTER (OUTPATIENT)
Dept: LAB | Age: 47
End: 2022-06-13
Attending: NURSE PRACTITIONER
Payer: COMMERCIAL

## 2022-06-13 ENCOUNTER — OFFICE VISIT (OUTPATIENT)
Dept: FAMILY MEDICINE CLINIC | Facility: CLINIC | Age: 47
End: 2022-06-13
Payer: COMMERCIAL

## 2022-06-13 VITALS
HEIGHT: 71 IN | SYSTOLIC BLOOD PRESSURE: 120 MMHG | WEIGHT: 170 LBS | DIASTOLIC BLOOD PRESSURE: 82 MMHG | OXYGEN SATURATION: 98 % | HEART RATE: 73 BPM | RESPIRATION RATE: 16 BRPM | BODY MASS INDEX: 23.8 KG/M2

## 2022-06-13 DIAGNOSIS — Z00.00 ROUTINE GENERAL MEDICAL EXAMINATION AT A HEALTH CARE FACILITY: Primary | ICD-10-CM

## 2022-06-13 DIAGNOSIS — Z00.00 ADULT GENERAL MEDICAL EXAMINATION: Primary | ICD-10-CM

## 2022-06-13 DIAGNOSIS — N92.0 MENORRHAGIA WITH REGULAR CYCLE: ICD-10-CM

## 2022-06-13 DIAGNOSIS — J30.2 SEASONAL ALLERGIES: ICD-10-CM

## 2022-06-13 LAB
ALBUMIN SERPL-MCNC: 4 G/DL (ref 3.4–5)
ALBUMIN/GLOB SERPL: 1.2 {RATIO} (ref 1–2)
ALP LIVER SERPL-CCNC: 36 U/L
ALT SERPL-CCNC: 28 U/L
ANION GAP SERPL CALC-SCNC: 6 MMOL/L (ref 0–18)
AST SERPL-CCNC: 17 U/L (ref 15–37)
BASOPHILS # BLD AUTO: 0.03 X10(3) UL (ref 0–0.2)
BASOPHILS NFR BLD AUTO: 0.7 %
BILIRUB SERPL-MCNC: 0.6 MG/DL (ref 0.1–2)
BUN BLD-MCNC: 15 MG/DL (ref 7–18)
BUN/CREAT SERPL: 17.4 (ref 10–20)
CALCIUM BLD-MCNC: 9.2 MG/DL (ref 8.5–10.1)
CHLORIDE SERPL-SCNC: 105 MMOL/L (ref 98–112)
CHOLEST SERPL-MCNC: 193 MG/DL (ref ?–200)
CO2 SERPL-SCNC: 27 MMOL/L (ref 21–32)
CREAT BLD-MCNC: 0.86 MG/DL
DEPRECATED HBV CORE AB SER IA-ACNC: 16.9 NG/ML
DEPRECATED RDW RBC AUTO: 40.8 FL (ref 35.1–46.3)
EOSINOPHIL # BLD AUTO: 0.05 X10(3) UL (ref 0–0.7)
EOSINOPHIL NFR BLD AUTO: 1.2 %
ERYTHROCYTE [DISTWIDTH] IN BLOOD BY AUTOMATED COUNT: 12.1 % (ref 11–15)
EST. AVERAGE GLUCOSE BLD GHB EST-MCNC: 105 MG/DL (ref 68–126)
FASTING PATIENT LIPID ANSWER: YES
FASTING STATUS PATIENT QL REPORTED: YES
GLOBULIN PLAS-MCNC: 3.4 G/DL (ref 2.8–4.4)
GLUCOSE BLD-MCNC: 100 MG/DL (ref 70–99)
HBA1C MFR BLD: 5.3 % (ref ?–5.7)
HCT VFR BLD AUTO: 42.3 %
HDLC SERPL-MCNC: 99 MG/DL (ref 40–59)
HGB BLD-MCNC: 14.2 G/DL
IMM GRANULOCYTES # BLD AUTO: 0.01 X10(3) UL (ref 0–1)
IMM GRANULOCYTES NFR BLD: 0.2 %
IRON SATN MFR SERPL: 39 %
IRON SERPL-MCNC: 144 UG/DL
LDLC SERPL CALC-MCNC: 84 MG/DL (ref ?–100)
LYMPHOCYTES # BLD AUTO: 1.57 X10(3) UL (ref 1–4)
LYMPHOCYTES NFR BLD AUTO: 39 %
MCH RBC QN AUTO: 30.8 PG (ref 26–34)
MCHC RBC AUTO-ENTMCNC: 33.6 G/DL (ref 31–37)
MCV RBC AUTO: 91.8 FL
MONOCYTES # BLD AUTO: 0.42 X10(3) UL (ref 0.1–1)
MONOCYTES NFR BLD AUTO: 10.4 %
NEUTROPHILS # BLD AUTO: 1.95 X10 (3) UL (ref 1.5–7.7)
NEUTROPHILS # BLD AUTO: 1.95 X10(3) UL (ref 1.5–7.7)
NEUTROPHILS NFR BLD AUTO: 48.5 %
NONHDLC SERPL-MCNC: 94 MG/DL (ref ?–130)
OSMOLALITY SERPL CALC.SUM OF ELEC: 287 MOSM/KG (ref 275–295)
PLATELET # BLD AUTO: 232 10(3)UL (ref 150–450)
POTASSIUM SERPL-SCNC: 4.4 MMOL/L (ref 3.5–5.1)
PROT SERPL-MCNC: 7.4 G/DL (ref 6.4–8.2)
RBC # BLD AUTO: 4.61 X10(6)UL
SODIUM SERPL-SCNC: 138 MMOL/L (ref 136–145)
TIBC SERPL-MCNC: 367 UG/DL (ref 240–450)
TRANSFERRIN SERPL-MCNC: 246 MG/DL (ref 200–360)
TRIGL SERPL-MCNC: 54 MG/DL (ref 30–149)
TSI SER-ACNC: 0.96 MIU/ML (ref 0.36–3.74)
VLDLC SERPL CALC-MCNC: 8 MG/DL (ref 0–30)
WBC # BLD AUTO: 4 X10(3) UL (ref 4–11)

## 2022-06-13 PROCEDURE — 85025 COMPLETE CBC W/AUTO DIFF WBC: CPT | Performed by: NURSE PRACTITIONER

## 2022-06-13 PROCEDURE — 83036 HEMOGLOBIN GLYCOSYLATED A1C: CPT | Performed by: NURSE PRACTITIONER

## 2022-06-13 PROCEDURE — 84466 ASSAY OF TRANSFERRIN: CPT | Performed by: NURSE PRACTITIONER

## 2022-06-13 PROCEDURE — 80053 COMPREHEN METABOLIC PANEL: CPT | Performed by: NURSE PRACTITIONER

## 2022-06-13 PROCEDURE — 36415 COLL VENOUS BLD VENIPUNCTURE: CPT | Performed by: NURSE PRACTITIONER

## 2022-06-13 PROCEDURE — 83540 ASSAY OF IRON: CPT | Performed by: NURSE PRACTITIONER

## 2022-06-13 PROCEDURE — 80061 LIPID PANEL: CPT

## 2022-06-13 PROCEDURE — 82728 ASSAY OF FERRITIN: CPT | Performed by: NURSE PRACTITIONER

## 2022-06-13 PROCEDURE — 84443 ASSAY THYROID STIM HORMONE: CPT | Performed by: NURSE PRACTITIONER

## 2022-06-13 PROCEDURE — 3074F SYST BP LT 130 MM HG: CPT | Performed by: NURSE PRACTITIONER

## 2022-06-13 PROCEDURE — 3008F BODY MASS INDEX DOCD: CPT | Performed by: NURSE PRACTITIONER

## 2022-06-13 PROCEDURE — 3079F DIAST BP 80-89 MM HG: CPT | Performed by: NURSE PRACTITIONER

## 2022-06-13 PROCEDURE — 99396 PREV VISIT EST AGE 40-64: CPT | Performed by: NURSE PRACTITIONER

## 2022-06-13 RX ORDER — AZELASTINE 1 MG/ML
2 SPRAY, METERED NASAL 2 TIMES DAILY
Qty: 30 ML | Refills: 2 | Status: SHIPPED | OUTPATIENT
Start: 2022-06-13

## 2022-10-25 ENCOUNTER — PATIENT MESSAGE (OUTPATIENT)
Dept: FAMILY MEDICINE CLINIC | Facility: CLINIC | Age: 47
End: 2022-10-25

## 2022-10-25 DIAGNOSIS — R19.7 DIARRHEA OF PRESUMED INFECTIOUS ORIGIN: Primary | ICD-10-CM

## 2022-10-26 NOTE — TELEPHONE ENCOUNTER
Michelle Vivar, [address] [facility] 10/25/2022 2:51 PM CDT    Pt wondering if you can order tests prior to appt  ----- Message -----  From: Jalen Moreno  Sent: 10/25/2022 1:01 PM CDT  To: Shavon Ennis Clinical Staff  Subject: Stool/blood test request?     Gianfranco Escobedo, I just did a virtual visit with urgent care due to having 7 days of diarrhea. They suggested primary care or urgent care to run blood work or stool sample to rule out parasitic or bacterial infection. We arrived home from [place] on Oct 14. Diarrhea started Oct 18 and has not improved. Had chills and a low fever one time on Oct 19. Your  said you were booked, I went online and grabbed an appt for Thursday but wondering if you can either squeeze me in before then or order tests? Thank you.  Laurie Doan [phone]

## 2023-02-02 ENCOUNTER — PATIENT MESSAGE (OUTPATIENT)
Dept: FAMILY MEDICINE CLINIC | Facility: CLINIC | Age: 48
End: 2023-02-02

## 2023-02-03 NOTE — TELEPHONE ENCOUNTER
RN called and spoke to patient. Patient tested positive for COVID yesterday on a home test.     Patient symptoms started on Wednesday. Pt c/o facial pressure, chills, exhaustion, cough,     Patient denies shortness of breath, chest pain, dizziness, syncope, severe headache, nausea, fever vomiting. RN recommended patient have IC evaluation or \"E visit now\" evaluation for COVID and paxlovid use. Pt instructed to seek care at the ED for chest pain, SOB, dizziness, high/persisitent fever, or any other urgent symptoms. Patient verbalized understanding and agreement with the plan.

## 2023-02-10 ENCOUNTER — PATIENT MESSAGE (OUTPATIENT)
Dept: FAMILY MEDICINE CLINIC | Facility: CLINIC | Age: 48
End: 2023-02-10

## 2023-02-10 DIAGNOSIS — Z12.31 BREAST CANCER SCREENING BY MAMMOGRAM: Primary | ICD-10-CM

## 2023-03-15 ENCOUNTER — TELEMEDICINE (OUTPATIENT)
Dept: FAMILY MEDICINE CLINIC | Facility: CLINIC | Age: 48
End: 2023-03-15
Payer: COMMERCIAL

## 2023-03-15 DIAGNOSIS — J01.00 ACUTE NON-RECURRENT MAXILLARY SINUSITIS: Primary | ICD-10-CM

## 2023-03-15 PROCEDURE — 99213 OFFICE O/P EST LOW 20 MIN: CPT | Performed by: NURSE PRACTITIONER

## 2023-03-15 RX ORDER — AMOXICILLIN AND CLAVULANATE POTASSIUM 875; 125 MG/1; MG/1
1 TABLET, FILM COATED ORAL 2 TIMES DAILY
Qty: 20 TABLET | Refills: 0 | Status: SHIPPED | OUTPATIENT
Start: 2023-03-15 | End: 2023-03-25

## 2023-03-20 ENCOUNTER — PATIENT MESSAGE (OUTPATIENT)
Dept: FAMILY MEDICINE CLINIC | Facility: CLINIC | Age: 48
End: 2023-03-20

## 2023-03-20 NOTE — TELEPHONE ENCOUNTER
I recommend patient follow-up to discuss. Bactrim is not a typical antibiotic for sinusitis and we would usually go with a different medicine which I can review with her. I also recommend she give the antibiotics a few more days to work. Can see me Wednesday (virtual) or Thursday (in-person), or can see Dr. Jam Can tomorrow if urgent. Thanks!

## 2023-04-11 ENCOUNTER — HOSPITAL ENCOUNTER (OUTPATIENT)
Dept: MAMMOGRAPHY | Age: 48
Discharge: HOME | End: 2023-04-11
Attending: NURSE PRACTITIONER
Payer: COMMERCIAL

## 2023-04-11 DIAGNOSIS — Z12.31 BREAST CANCER SCREENING BY MAMMOGRAM: ICD-10-CM

## 2023-04-11 PROCEDURE — 77063 BREAST TOMOSYNTHESIS BI: CPT | Performed by: NURSE PRACTITIONER

## 2023-04-11 PROCEDURE — 77067 SCR MAMMO BI INCL CAD: CPT | Performed by: NURSE PRACTITIONER

## 2023-05-03 ENCOUNTER — LAB ENCOUNTER (OUTPATIENT)
Dept: LAB | Age: 48
End: 2023-05-03
Attending: INTERNAL MEDICINE
Payer: COMMERCIAL

## 2023-05-03 DIAGNOSIS — N94.3 PMS (PREMENSTRUAL SYNDROME): Primary | ICD-10-CM

## 2023-05-03 DIAGNOSIS — E88.9 NUTRITIONAL AND METABOLIC CARDIOMYOPATHY (HCC): ICD-10-CM

## 2023-05-03 DIAGNOSIS — N92.0 EXCESSIVE OR FREQUENT MENSTRUATION: ICD-10-CM

## 2023-05-03 DIAGNOSIS — I43 NUTRITIONAL AND METABOLIC CARDIOMYOPATHY (HCC): ICD-10-CM

## 2023-05-03 DIAGNOSIS — E63.9 NUTRITIONAL AND METABOLIC CARDIOMYOPATHY (HCC): ICD-10-CM

## 2023-05-03 LAB
EST. AVERAGE GLUCOSE BLD GHB EST-MCNC: 97 MG/DL (ref 68–126)
HBA1C MFR BLD: 5 % (ref ?–5.7)
INSULIN SERPL-ACNC: 4.3 MU/L (ref 3–25)

## 2023-05-03 PROCEDURE — 83036 HEMOGLOBIN GLYCOSYLATED A1C: CPT

## 2023-05-03 PROCEDURE — 83525 ASSAY OF INSULIN: CPT

## 2023-05-03 PROCEDURE — 36415 COLL VENOUS BLD VENIPUNCTURE: CPT

## 2023-10-18 ENCOUNTER — APPOINTMENT (OUTPATIENT)
Dept: URBAN - METROPOLITAN AREA CLINIC 244 | Age: 48
Setting detail: DERMATOLOGY
End: 2023-10-21

## 2023-10-18 DIAGNOSIS — L82.0 INFLAMED SEBORRHEIC KERATOSIS: ICD-10-CM

## 2023-10-18 DIAGNOSIS — L81.4 OTHER MELANIN HYPERPIGMENTATION: ICD-10-CM

## 2023-10-18 DIAGNOSIS — Z85.828 PERSONAL HISTORY OF OTHER MALIGNANT NEOPLASM OF SKIN: ICD-10-CM

## 2023-10-18 PROCEDURE — 17110 DESTRUCT B9 LESION 1-14: CPT

## 2023-10-18 PROCEDURE — 99202 OFFICE O/P NEW SF 15 MIN: CPT | Mod: 25

## 2023-10-18 PROCEDURE — OTHER COUNSELING: OTHER

## 2023-10-18 PROCEDURE — OTHER LIQUID NITROGEN: OTHER

## 2023-10-18 ASSESSMENT — LOCATION SIMPLE DESCRIPTION DERM
LOCATION SIMPLE: LEFT FOREHEAD
LOCATION SIMPLE: RIGHT FOREHEAD
LOCATION SIMPLE: LEFT CHEEK

## 2023-10-18 ASSESSMENT — LOCATION DETAILED DESCRIPTION DERM
LOCATION DETAILED: RIGHT FOREHEAD
LOCATION DETAILED: LEFT CENTRAL BUCCAL CHEEK
LOCATION DETAILED: LEFT INFERIOR FOREHEAD

## 2023-10-18 ASSESSMENT — LOCATION ZONE DERM: LOCATION ZONE: FACE

## 2023-10-18 NOTE — PROCEDURE: LIQUID NITROGEN
Number Of Freeze-Thaw Cycles: 1 freeze-thaw cycle
Show Aperture Variable?: Yes
Include Z78.9 (Other Specified Conditions Influencing Health Status) As An Associated Diagnosis?: No
Spray Paint Text: The liquid nitrogen was applied to the skin utilizing a spray paint frosting technique.
Detail Level: Simple
Consent: The patient's consent was obtained including but not limited to risks of crusting, scabbing, blistering, scarring, darker or lighter pigmentary change, recurrence, incomplete removal and infection.
Medical Necessity Information: It is in your best interest to select a reason for this procedure from the list below. All of these items fulfill various CMS LCD requirements except the new and changing color options.
Medical Necessity Clause: This procedure was medically necessary because the lesions that were treated were:
Post-Care Instructions: I reviewed with the patient in detail post-care instructions. Patient is to wear sunprotection, and avoid picking at any of the treated lesions. Pt may apply Vaseline to crusted or scabbing areas.

## 2023-10-18 NOTE — HPI: SKIN LESION
How Severe Is Your Skin Lesion?: mild
Has Your Skin Lesion Been Treated?: not been treated
Is This A New Presentation, Or A Follow-Up?: Skin Lesion
Additional History: Attempted to pop it as a pimple

## 2023-11-16 ENCOUNTER — TELEPHONE (OUTPATIENT)
Dept: FAMILY MEDICINE CLINIC | Facility: CLINIC | Age: 48
End: 2023-11-16

## 2023-11-16 ENCOUNTER — PATIENT MESSAGE (OUTPATIENT)
Dept: FAMILY MEDICINE CLINIC | Facility: CLINIC | Age: 48
End: 2023-11-16

## 2023-11-16 DIAGNOSIS — Z11.1 ENCOUNTER FOR SCREENING FOR RESPIRATORY TUBERCULOSIS: Primary | ICD-10-CM

## 2023-11-16 DIAGNOSIS — Z01.84 IMMUNITY STATUS TESTING: ICD-10-CM

## 2023-11-16 NOTE — TELEPHONE ENCOUNTER
Pt requesting varicella,MMR titers, and Quantiferon TB Gold testing for work requirements. Labs pended; authorize if appropriate.

## 2023-11-21 ENCOUNTER — TELEMEDICINE (OUTPATIENT)
Dept: FAMILY MEDICINE CLINIC | Facility: CLINIC | Age: 48
End: 2023-11-21
Payer: COMMERCIAL

## 2023-11-21 DIAGNOSIS — J01.40 ACUTE NON-RECURRENT PANSINUSITIS: Primary | ICD-10-CM

## 2023-11-21 PROCEDURE — 99213 OFFICE O/P EST LOW 20 MIN: CPT | Performed by: NURSE PRACTITIONER

## 2023-11-21 RX ORDER — AMOXICILLIN AND CLAVULANATE POTASSIUM 875; 125 MG/1; MG/1
1 TABLET, FILM COATED ORAL 2 TIMES DAILY
Qty: 20 TABLET | Refills: 0 | Status: SHIPPED | OUTPATIENT
Start: 2023-11-21 | End: 2023-12-01

## 2023-11-21 RX ORDER — NORETHINDRONE ACETATE AND ETHINYL ESTRADIOL, ETHINYL ESTRADIOL AND FERROUS FUMARATE 1MG-10(24)
KIT ORAL
COMMUNITY
Start: 2023-11-07

## 2023-11-21 NOTE — PROGRESS NOTES
Due to the real risk of possible exposure to Coronavirus (CoV-2, COVID-19) in the office/medical building and recommendations for social distancing (key to mitigation/limiting the spread of the virus) a Virtual or Telemedicine visit over the phone was performed as below. Patient has consented to the Virtual/Telephone Check-In service and expresses understanding and accepts financial responsibility for any deductible, co-insurance and/or co-pays associated with this service. Telehealth outside of [address] Verbal Consent   I conducted a telehealth visit with Carol Queen today, 11/21/23, which was completed using two-way, real-time interactive audio and video communication. This has been done in good keke to provide continuity of care in the best interest of the provider-patient relationship, due to the COVID -19 public health crisis/national emergency where restrictions of face-to-face office visits are ongoing. Every conscious effort was taken to allow for sufficient and adequate time to complete the visit. The patient was made aware of the limitations of the telehealth visit, including treatment limitations as no physical exam could be performed. The patient was advised to call 911 or to go to the ER in case there was an emergency. The patient was also advised of the potential privacy & security concerns related to the telehealth platform. The patient was made aware of where to find [facility] notice of privacy practices, telehealth consent form and other related consent forms and documents. which are located on the [facility] website. The patient verbally agreed to telehealth consent form, related consents and the risks discussed. Lastly, the patient confirmed that they were in [place]. Included in this visit, time may have been spent reviewing labs, medications, radiology tests and decision making.  Appropriate medical decision-making and tests are ordered as detailed in the plan of care above.  Coding/billing information is submitted for this visit based on complexity of care and/or time spent for the visit. HPI:  Chief Complaint   Patient presents with    Follow - Up     Possible sinus infection       Sally Hester is a 53year old female who calls for complaints of:    Possible sinus infection. +Facial pressure, headache, teeth pressure. Symptoms started about a week ago and are not improving. Trying sinus rinse, Mucinex-D, Zyrtec. She is leaving town tomorrow for Thanksgiving and requests an antibiotic to take with her in case symptoms do not improve/worsen. ROS:  GEN: Denies fever, chills, fatigue. HEENT: +Congestion, facial pressure. CARDIOVASCULAR: Denies chest pain, dyspnea. RESPIRATORY: Denies cough, dyspnea. GI: Denies n/v/d/c, appetite normal.  NEURO: Denies dizziness. +Headaches. Physical Exam:  GEN:  Patient is alert, awake and oriented, well developed, well nourished. LUNGS: Patient speaks clearly in full sentences without dyspnea, no cough while on video visit. PSYCH: Appropriate mood and affect. Allergies   Allergen Reactions    Benzodiazepines OTHER (SEE COMMENTS) and NAUSEA AND VOMITING     Other reaction(s): Nausea and Vomiting  Hard to wake up  Other reaction(s): Nausea and Vomiting  Hard to wake up       Current Outpatient Medications   Medication Sig Dispense Refill    azelastine 0.1 % Nasal Solution 2 sprays by Nasal route 2 (two) times daily. 30 mL 2     No past medical history on file. Past Surgical History:   Procedure Laterality Date    HIP SURGERY  04/2017    Labral repair - Dr. Hawa Peng      5 years ago    KNEE ARTHROSCOPY  1996    OTHER SURGICAL HISTORY      sinus surgery         ASSESSMENT AND PLAN:   1. Patient is a 53year old female who calls for: Sinus infection    Additional Assessment and Plan:  Acute sinusitis  -Recommended symptomatic treatment for another 2-3 days prior to starting antibiotics.  If no improvement can start Augmentin PO BID x 10 days. To take with food. Should finish entire rx even if symptoms improve. Side effects of antibiotics reviewed with patient. Recommended daily yogurt or probiotic while on this medicine.  -Continue symptomatic treatment. Follow up with me 1-2 weeks PRN    Call and/or go to the ER if worsening symptoms including, but not limited to: respiratory distress, shortness of breath and  wheezing, worsening fever, cough and mental status. The patient (or patient's parent if <17 y/o) indicates understanding of the above recommendations and agrees to the above plan. No orders of the defined types were placed in this encounter.       Meds & Refills for this Visit:  Requested Prescriptions      No prescriptions requested or ordered in this encounter       Imaging & Consults:  None    JOSE Bean     Time spent during encounter: 15 minutes

## 2023-11-29 LAB
MEASLES ANTIBODY (IGG): 40.6 AU/ML
MITOGEN-NIL: >10 IU/ML
MUMPS VIRUS$ANTIBODY (IGG): 178 AU/ML
NIL: 0.03 IU/ML
QUANTIFERON(R)-TB GOLD PLUS, 1 TUBE: NEGATIVE
RUBELLA ANTIBODY (IGG): 13.4 INDEX
TB1-NIL: 0.01 IU/ML
TB2-NIL: <0 IU/ML
VARICELLA ZOSTER VIRUS$AB (IGG): 1364 INDEX

## 2024-04-04 ENCOUNTER — OFFICE VISIT (OUTPATIENT)
Dept: FAMILY MEDICINE CLINIC | Facility: CLINIC | Age: 49
End: 2024-04-04
Payer: COMMERCIAL

## 2024-04-04 VITALS
BODY MASS INDEX: 24.52 KG/M2 | DIASTOLIC BLOOD PRESSURE: 70 MMHG | HEART RATE: 78 BPM | OXYGEN SATURATION: 98 % | TEMPERATURE: 97 F | SYSTOLIC BLOOD PRESSURE: 110 MMHG | WEIGHT: 175.19 LBS | HEIGHT: 71 IN

## 2024-04-04 DIAGNOSIS — Z00.00 ADULT GENERAL MEDICAL EXAMINATION: Primary | ICD-10-CM

## 2024-04-04 DIAGNOSIS — Z86.39 HISTORY OF IRON DEFICIENCY: ICD-10-CM

## 2024-04-04 DIAGNOSIS — Z12.31 SCREENING MAMMOGRAM, ENCOUNTER FOR: ICD-10-CM

## 2024-04-04 PROBLEM — D21.9 LEIOMYOMA: Status: ACTIVE | Noted: 2024-01-08

## 2024-04-04 PROCEDURE — 3008F BODY MASS INDEX DOCD: CPT | Performed by: NURSE PRACTITIONER

## 2024-04-04 PROCEDURE — 3074F SYST BP LT 130 MM HG: CPT | Performed by: NURSE PRACTITIONER

## 2024-04-04 PROCEDURE — 99396 PREV VISIT EST AGE 40-64: CPT | Performed by: NURSE PRACTITIONER

## 2024-04-04 PROCEDURE — 3078F DIAST BP <80 MM HG: CPT | Performed by: NURSE PRACTITIONER

## 2024-04-04 RX ORDER — CHOLECALCIFEROL (VITD3)/VIT K2 1250-200
1 CAPSULE ORAL DAILY
COMMUNITY

## 2024-04-04 RX ORDER — FERROUS SULFATE 324(65)MG
324 TABLET, DELAYED RELEASE (ENTERIC COATED) ORAL DAILY
COMMUNITY

## 2024-04-04 RX ORDER — MELATONIN 10 MG
10 CAPSULE ORAL DAILY
COMMUNITY

## 2024-04-04 RX ORDER — CHLORAL HYDRATE 500 MG
CAPSULE ORAL DAILY
COMMUNITY

## 2024-04-04 RX ORDER — METHYLPREDNISOLONE 4 MG
1 TABLET, DOSE PACK ORAL DAILY
COMMUNITY

## 2024-04-04 NOTE — PROGRESS NOTES
Chief Complaint:   Chief Complaint   Patient presents with    Physical       HPI:   This is a 48 year old female coming in for annual physical. She denies any pain or complaints at this time. She had fibroid removal surgery in January and states that her menses are might lighter. Hx of iron deficiency due to heavy periods and takes an iron supplement daily. She reports good diet and exercise.      Results for orders placed or performed in visit on 11/16/23   VARICELLA ZOSTER, IGG [4439] [Q]   Result Value Ref Range    VARICELLA ZOSTER VIRUS$AB (IGG) 1,364.00 index   Measles (Rubeola) Antibodies, IGG [Ed/Elm=Qual/ Quest=Quant]   Result Value Ref Range    MEASLES ANTIBODY (IGG) 40.60 AU/mL   Mumps Antibodies, IGG [Ed/Elm=Qual/ Quest=Quant]   Result Value Ref Range    MUMPS VIRUS$ANTIBODY (IGG) 178.00 AU/mL   Rubella, IGG [Ed/Elm=Qual/ Quest=Quant]   Result Value Ref Range    RUBELLA ANTIBODY (IGG) 13.40 Index   QUANTIFERON(R)-TB GOLD PLUS, 1 TUBE   Result Value Ref Range    QUANTIFERON(R)-TB GOLD PLUS, 1 TUBE NEGATIVE NEGATIVE    NIL 0.03 IU/mL    MITOGEN-NIL >10.00 IU/mL    TB1-NIL 0.01 IU/mL    TB2-NIL <0.00 IU/mL             History reviewed. No pertinent past medical history.  Past Surgical History:   Procedure Laterality Date    HIP SURGERY  04/2017    Labral repair - Dr. Kimo Mathur    IMPLANTABLE BREAST PROSTHESIS      5 years ago    KNEE ARTHROSCOPY  1996    OTHER SURGICAL HISTORY      sinus surgery     Social History:  Social History     Socioeconomic History    Marital status:    Tobacco Use    Smoking status: Never    Smokeless tobacco: Never   Vaping Use    Vaping Use: Never used   Substance and Sexual Activity    Alcohol use: Yes     Alcohol/week: 1.0 standard drink of alcohol     Types: 1 Glasses of wine per week    Drug use: No    Sexual activity: Yes     Partners: Male     Birth control/protection: Vasectomy     Family History:  Family History   Problem Relation Age of Onset    Cancer Mother  61        uterine 1A-doing well    Breast Cancer Paternal Aunt 55     Allergies:  Allergies   Allergen Reactions    Benzodiazepines NAUSEA AND VOMITING and OTHER (SEE COMMENTS)     Other reaction(s): Nausea and Vomiting    Hard to wake up    Pt states it took several hours to wake up after receiving versed     Current Meds:  Current Outpatient Medications   Medication Sig Dispense Refill    Ferrous Sulfate 324 (65 Fe) MG Oral Tab EC Take 324 mg by mouth daily.      Melatonin 10 MG Oral Cap Take 10 mg by mouth daily.      Multiple Vitamins-Minerals (WOMENS DAILY FORM/FA/CA/FE) Oral Tab Take 1 tablet by mouth daily.      Omega-3 Fatty Acids (OMEGA-3 FISH OIL) 1000 MG Oral Cap Take by mouth daily.      Vitamin D-Vitamin K (DECARA K) 1250-200 MCG Oral Cap Take 1 capsule by mouth daily.        Counseling given: No       REVIEW OF SYSTEMS:   CONSTITUTIONAL:  Denies unusual weight gain/loss, fever, chills, or fatigue.  HEENT:  Eyes:  Denies eye pain, visual loss, blurred vision. Denies hearing loss, sneezing, congestion, runny nose or sore throat.  INTEGUMENTARY:  Denies rashes, itching, skin lesion.  CARDIOVASCULAR:  Denies chest pain, palpitations, edema, dyspnea on exertion or at rest.  RESPIRATORY:  Denies shortness of breath, wheezing, cough or sputum.  GASTROINTESTINAL:  Denies abdominal pain, nausea, vomiting, constipation, diarrhea, or blood in stool.  GENITOURINARY: Denies dysuria, hematuria, frequency.  MUSCULOSKELETAL:  Denies weakness, muscle aches, back pain, joint pain, swelling or stiffness.  NEUROLOGICAL:  Denies headache, seizures, dizziness.  PSYCHIATRIC:  Denies depression or anxiety. Denies suicidal thoughts.    EXAM:   /70   Pulse 78   Temp 97 °F (36.1 °C)   Ht 5' 11\" (1.803 m)   Wt 175 lb 3.2 oz (79.5 kg)   LMP 04/01/2023   SpO2 98%   BMI 24.44 kg/m²  Estimated body mass index is 24.44 kg/m² as calculated from the following:    Height as of this encounter: 5' 11\" (1.803 m).    Weight as  of this encounter: 175 lb 3.2 oz (79.5 kg).   Vital signs reviewed.Appears stated age, well groomed, in no acute distress.  Physical Exam:  GEN:  Patient is alert, awake and oriented, well developed, well nourished.  HEENT:  Head:  Normocephalic, atraumatic Eyes: EOMI, PERRLA, conjunctivae clear bilaterally, no eye discharge Ears: External normal, TM clear bilaterally. Nose: patent, no nasal discharge. Throat:  No tonsillar erythema or exudate.  Mouth:  No oral lesions, good dentition.  NECK: Supple, no CLAD, no thyromegaly.  SKIN: No rashes, no skin lesion, no bruising, good turgor.  HEART:  Regular rate and rhythm, no murmurs, rubs or gallops.  LUNGS: Clear to auscultation bilterally, no rales/rhonchi/wheezing.  ABDOMEN:  Soft, nondistended, nontender, bowel sounds normal in all 4 quadrants, no masses, no hepatosplenomegaly.  BACK: No tenderness to palpation, FROM.  EXTREMITIES:  No edema, no cyanosis, no clubbing, FROM, 2+ dorsalis pedis pulses bilaterally.  NEURO:  No deficit, normal gait, strength and tone, sensory intact, normal reflexes.    ASSESSMENT AND PLAN:   1. Adult general medical examination  -Healthy diet and regular exercise.   - Recommended annual vision exam and biannual dental exam.   - Ordered labs below  - CBC With Differential With Platelet  - Comp Metabolic Panel (14)  - Hemoglobin A1C  - Lipid Panel  - Assay, Thyroid Stim Hormone    2. Screening mammogram, encounter for  - Screening exam ordered as below.   - [facility] FANG 2D+3D SCREENING BILAT (CPT=77067/35335); Future    3. History of iron deficiency  - CPM.  - Will repeat iron levels with next blood draw.  - Iron And Tibc  - Ferritin      Meds & Refills for this Visit:  Requested Prescriptions      No prescriptions requested or ordered in this encounter         Problem List:  Patient Active Problem List   Diagnosis    Leiomyoma       Krystal Sarah, APRN  4/4/2024  10:56 AM

## 2024-04-24 ENCOUNTER — HOSPITAL ENCOUNTER (OUTPATIENT)
Dept: MAMMOGRAPHY | Age: 49
Discharge: HOME | End: 2024-04-24
Attending: NURSE PRACTITIONER
Payer: COMMERCIAL

## 2024-04-24 DIAGNOSIS — Z12.31 SCREENING MAMMOGRAM, ENCOUNTER FOR: ICD-10-CM

## 2024-04-24 PROCEDURE — 77063 BREAST TOMOSYNTHESIS BI: CPT | Performed by: NURSE PRACTITIONER

## 2024-04-24 PROCEDURE — 77067 SCR MAMMO BI INCL CAD: CPT | Performed by: NURSE PRACTITIONER

## 2024-04-30 ENCOUNTER — NURSE TRIAGE (OUTPATIENT)
Dept: FAMILY MEDICINE CLINIC | Facility: CLINIC | Age: 49
End: 2024-04-30

## 2024-04-30 ENCOUNTER — PATIENT MESSAGE (OUTPATIENT)
Dept: FAMILY MEDICINE CLINIC | Facility: CLINIC | Age: 49
End: 2024-04-30

## 2024-04-30 NOTE — TELEPHONE ENCOUNTER
RN s/w patient.    Patient c/o pressure in face, feeling worn out, dull headache and teeth pain. Patient stated her symptoms started about 4 days ago.    Patient denies fever, coughing and severe headache.    Patient stated she just had a sinus infection in March and was treated with an antibiotic.    Patient stopped taking Dymista nasal spray about a year ago and wondering if she should start taking it again.     Patient scheduled for a video appointment with Krystal HOLGUIN.      Reason for Disposition   Patient wants to be seen    Protocols used: Sinus Pain and Congestion-A-[place]

## 2024-05-01 ENCOUNTER — TELEMEDICINE (OUTPATIENT)
Dept: FAMILY MEDICINE CLINIC | Facility: CLINIC | Age: 49
End: 2024-05-01
Payer: COMMERCIAL

## 2024-05-01 DIAGNOSIS — J34.89 SINUS PRESSURE: Primary | ICD-10-CM

## 2024-05-01 PROCEDURE — 99213 OFFICE O/P EST LOW 20 MIN: CPT | Performed by: NURSE PRACTITIONER

## 2024-05-01 RX ORDER — AZELASTINE HYDROCHLORIDE, FLUTICASONE PROPIONATE 137; 50 UG/1; UG/1
1 SPRAY, METERED NASAL 2 TIMES DAILY
Qty: 3 EACH | Refills: 1 | Status: SHIPPED | OUTPATIENT
Start: 2024-05-01

## 2024-05-01 NOTE — PROGRESS NOTES
Due to the real risk of possible exposure to Coronavirus (CoV-2, COVID-19) in the office/medical building and recommendations for social distancing (key to mitigation/limiting the spread of the virus) a Virtual or Telemedicine visit over the phone was performed as below.     Patient has consented to the Virtual/Telephone Check-In service and expresses understanding and accepts financial responsibility for any deductible, co-insurance and/or co-pays associated with this service.    [facility]  Telehealth Verbal Consent   I conducted a telehealth visit with Felias Falcon today, 05/01/24, which was completed using two-way, real-time interactive audio and video communication. This has been done in good keke to provide continuity of care in the best interest of the provider-patient relationship, due to the COVID -19 public health crisis/national emergency where restrictions of face-to-face office visits are ongoing. Every conscious effort was taken to allow for sufficient and adequate time to complete the visit.  The patient was made aware of the limitations of the telehealth visit, including treatment limitations as no physical exam could be performed.  The patient was advised to call 911 or to go to the ER in case there was an emergency.  The patient was also advised of the potential privacy & security concerns related to the telehealth platform.   The patient was made aware of where to find [facility]'s notice of privacy practices, telehealth consent form and other related consent forms and documents.  which are located on the [facility] website. The patient verbally agreed to telehealth consent form, related consents and the risks discussed.    Lastly, the patient confirmed that they were in [place].   Included in this visit, time may have been spent reviewing labs, medications, radiology tests and decision making. Appropriate medical decision-making and tests are ordered as detailed in the plan of care  above.  Coding/billing information is submitted for this visit based on complexity of care and/or time spent for the visit.    HPI:  Chief Complaint   Patient presents with    Follow - Up     Sinus pressure       Felisa Falcon is a 48 year old female who calls for complaints of:    Took antibiotics (Augmentin) for sinus infection end of March from outside provider. Symptoms resolved. Now symptoms started about 5 days ago with pressure behind teeth, dull headache, +PND. Not congested in the nose. Denies fevers, GI symptoms, sore throat, cough. Using Flonase, nose rinses, Zyrtec, Mucinex D. Helps mildly. Hx balloon sinusoplasty in [place] in 2011. After that, she used Dymista and this worked well for her. Wondering about restarting.     ROS:  Negative except as above.    Physical Exam:  GEN:  Patient is alert, awake and oriented, well developed, well nourished.  LUNGS: Patient speaks clearly in full sentences without dyspnea, no cough while on video visit.  PSYCH: Appropriate mood and affect.    Allergies   Allergen Reactions    Benzodiazepines NAUSEA AND VOMITING and OTHER (SEE COMMENTS)     Other reaction(s): Nausea and Vomiting    Hard to wake up    Pt states it took several hours to wake up after receiving versed     Current Outpatient Medications   Medication Sig Dispense Refill    Melatonin 10 MG Oral Cap Take 10 mg by mouth daily.      Multiple Vitamins-Minerals (WOMENS DAILY FORM/FA/CA/FE) Oral Tab Take 1 tablet by mouth daily.      Omega-3 Fatty Acids (OMEGA-3 FISH OIL) 1000 MG Oral Cap Take by mouth daily.      Vitamin D-Vitamin K (DECARA K) 1250-200 MCG Oral Cap Take 1 capsule by mouth daily.      Ferrous Sulfate 324 (65 Fe) MG Oral Tab EC Take 324 mg by mouth daily.       No past medical history on file.  Past Surgical History:   Procedure Laterality Date    Hip surgery  04/2017    Labral repair - Dr. Kimo Mathur    Implantable breast prosthesis      5 years ago    Knee arthroscopy  1996    Other  surgical history      sinus surgery         ASSESSMENT AND PLAN:   1. Patient is a 48 year old female who calls for: Sinus pressure    Additional Assessment and Plan:  Sinus pressure  -Will restart Dymista. To stop Flonase when she starts this. Continue other symptomatic medications. Push PO fluids, humidifier, rest. Warm compress to sinuses. If no improvement/worsening symptoms after 10-14 days of illness she can notify me and I will prescribe antibiotic.      Follow up with me via [employer] message in 5-10 days PRN    Call and/or go to the ER if worsening symptoms including, but not limited to: respiratory distress, shortness of breath and  wheezing, worsening fever, cough and mental status.      The patient (or patient's parent if <17 y/o) indicates understanding of the above recommendations and agrees to the above plan.    No orders of the defined types were placed in this encounter.      Meds & Refills for this Visit:  Requested Prescriptions      No prescriptions requested or ordered in this encounter       Imaging & Consults:  None    JOSE Suárez     Time spent during encounter: 15 minutes

## 2024-05-03 ENCOUNTER — PATIENT MESSAGE (OUTPATIENT)
Dept: FAMILY MEDICINE CLINIC | Facility: CLINIC | Age: 49
End: 2024-05-03

## 2024-05-04 ENCOUNTER — OFFICE VISIT (OUTPATIENT)
Dept: FAMILY MEDICINE CLINIC | Facility: CLINIC | Age: 49
End: 2024-05-04
Payer: COMMERCIAL

## 2024-05-04 VITALS
HEART RATE: 80 BPM | BODY MASS INDEX: 24.5 KG/M2 | OXYGEN SATURATION: 98 % | WEIGHT: 175 LBS | HEIGHT: 71 IN | TEMPERATURE: 97 F | DIASTOLIC BLOOD PRESSURE: 78 MMHG | SYSTOLIC BLOOD PRESSURE: 122 MMHG

## 2024-05-04 DIAGNOSIS — Z87.09 HISTORY OF CHRONIC SINUSITIS: ICD-10-CM

## 2024-05-04 DIAGNOSIS — J01.01 ACUTE RECURRENT MAXILLARY SINUSITIS: Primary | ICD-10-CM

## 2024-05-04 DIAGNOSIS — Z98.890 HISTORY OF SINUS SURGERY: ICD-10-CM

## 2024-05-04 PROCEDURE — 99213 OFFICE O/P EST LOW 20 MIN: CPT | Performed by: STUDENT IN AN ORGANIZED HEALTH CARE EDUCATION/TRAINING PROGRAM

## 2024-05-04 PROCEDURE — 3078F DIAST BP <80 MM HG: CPT | Performed by: STUDENT IN AN ORGANIZED HEALTH CARE EDUCATION/TRAINING PROGRAM

## 2024-05-04 PROCEDURE — 3074F SYST BP LT 130 MM HG: CPT | Performed by: STUDENT IN AN ORGANIZED HEALTH CARE EDUCATION/TRAINING PROGRAM

## 2024-05-04 PROCEDURE — 3008F BODY MASS INDEX DOCD: CPT | Performed by: STUDENT IN AN ORGANIZED HEALTH CARE EDUCATION/TRAINING PROGRAM

## 2024-05-04 RX ORDER — AMOXICILLIN AND CLAVULANATE POTASSIUM 875; 125 MG/1; MG/1
1 TABLET, FILM COATED ORAL 2 TIMES DAILY
Qty: 14 TABLET | Refills: 0 | Status: SHIPPED | OUTPATIENT
Start: 2024-05-04 | End: 2024-05-11

## 2024-05-04 NOTE — PROGRESS NOTES
Subjective:   Felisa Falcon is a 48 year old female who presents for Follow - Up (Congestion & sinus pressure. )     48-year-old female with history of chronic sinusitis status post balloon sinuplasty in [place] in 2011 coming in to follow-up due to worsening sinus pressure.  She was recently treated with Augmentin for sinusitis towards the end of March.  Was seen on 5/1/2024 after developing 5 days of pressure behind her teeth, dull headache and postnasal drip.  She used to be on Dymista in the past, was attempting to use it again however not yet picked up from pharmacy due to insurance using fluticasone  now.  At this time felt as if sinus pressure and congestion worsening.  Occasionally coughing yellow-greenish mucus.  She has been taking ibuprofen, Mucinex D and Zyrtec.  This morning switched Zyrtec to Xyzal.  Historically she followed up with an allergist in [place] and received allergy shots around 8 years ago.  At this time denies fever, chills, SOB.    History/Other:    Chief Complaint Reviewed and Verified  Nursing Notes Reviewed and   Verified  Tobacco Reviewed  Allergies Reviewed  Medications Reviewed    Medical History Reviewed  Surgical History Reviewed  OB Status Reviewed    Family History Reviewed  Social History Reviewed         Tobacco:  She has never smoked tobacco.    Current Outpatient Medications   Medication Sig Dispense Refill    amoxicillin clavulanate 875-125 MG Oral Tab Take 1 tablet by mouth 2 (two) times daily for 7 days. 14 tablet 0    Azelastine-Fluticasone (DYMISTA) 137-50 MCG/ACT Nasal Suspension 1 spray by Nasal route in the morning and 1 spray before bedtime. 3 each 1    Melatonin 10 MG Oral Cap Take 10 mg by mouth daily.      Multiple Vitamins-Minerals (WOMENS DAILY FORM/FA/CA/FE) Oral Tab Take 1 tablet by mouth daily.      Omega-3 Fatty Acids (OMEGA-3 FISH OIL) 1000 MG Oral Cap Take by mouth daily.      Vitamin D-Vitamin K (DECARA K) 1250-200 MCG Oral Cap Take 1  capsule by mouth daily.      Ferrous Sulfate 324 (65 Fe) MG Oral Tab EC Take 324 mg by mouth daily.           Review of Systems:  Review of Systems   Constitutional:  Negative for chills, diaphoresis and fever.   HENT:  Positive for postnasal drip and sinus pressure. Negative for congestion, ear discharge, ear pain and sore throat.    Eyes:  Negative for pain and discharge.   Respiratory:  Positive for cough (mild occasional due to postnasal drip). Negative for chest tightness, shortness of breath and wheezing.    Cardiovascular:  Negative for chest pain and palpitations.   Gastrointestinal:  Negative for abdominal pain, diarrhea, nausea and vomiting.   Endocrine: Negative for cold intolerance and heat intolerance.   Genitourinary:  Negative for dysuria, flank pain, frequency and urgency.   Musculoskeletal:  Negative for joint swelling.   Skin:  Negative for rash.   Neurological:  Negative for dizziness, syncope and headaches.   Psychiatric/Behavioral:  Negative for confusion and hallucinations.        Objective:   /78 (BP Location: Right arm, Patient Position: Sitting, Cuff Size: adult)   Pulse 80   Temp 97.1 °F (36.2 °C) (Temporal)   Ht 5' 11\" (1.803 m)   Wt 175 lb (79.4 kg)   LMP 04/11/2024   SpO2 98%   BMI 24.41 kg/m²  Estimated body mass index is 24.41 kg/m² as calculated from the following:    Height as of this encounter: 5' 11\" (1.803 m).    Weight as of this encounter: 175 lb (79.4 kg).  Physical Exam  Constitutional:       General: She is not in acute distress.     Appearance: Normal appearance. She is not ill-appearing or toxic-appearing.   HENT:      Head: Normocephalic and atraumatic.      Right Ear: Tympanic membrane and ear canal normal.      Left Ear: Tympanic membrane and ear canal normal.      Nose:      Right Sinus: Maxillary sinus tenderness present. No frontal sinus tenderness.      Left Sinus: Maxillary sinus tenderness present. No frontal sinus tenderness.      Mouth/Throat:       Mouth: Mucous membranes are moist.      Pharynx: Oropharynx is clear. No oropharyngeal exudate or posterior oropharyngeal erythema.   Eyes:      Extraocular Movements: Extraocular movements intact.      Pupils: Pupils are equal, round, and reactive to light.   Cardiovascular:      Rate and Rhythm: Normal rate and regular rhythm.      Heart sounds: Normal heart sounds. No murmur heard.     No gallop.   Pulmonary:      Effort: Pulmonary effort is normal. No respiratory distress.      Breath sounds: Normal breath sounds. No stridor. No wheezing, rhonchi or rales.   Abdominal:      General: Bowel sounds are normal.      Palpations: Abdomen is soft.      Tenderness: There is no abdominal tenderness. There is no guarding.   Musculoskeletal:         General: No swelling.      Cervical back: Normal range of motion and neck supple. No rigidity or tenderness.   Skin:     General: Skin is warm and dry.   Neurological:      General: No focal deficit present.      Mental Status: She is alert and oriented to person, place, and time. Mental status is at baseline.   Psychiatric:         Mood and Affect: Mood normal.         Behavior: Behavior normal.         Thought Content: Thought content normal.         Judgment: Judgment normal.         Assessment & Plan:     1. Acute recurrent maxillary sinusitis (Primary)  Around 8 days of symptoms with no improvement.  History of chronic sinusitis status post balloon sinuplasty in [place] in 2011.  -Over-the-counter analgesics and antipyretics such as nonsteroidal antiinflammatory drugs (NSAIDs) and acetaminophen can be used for pain and fever relief as needed  -Continue with mechanical irrigation with saline to reduce the need for pain medication and improve overall comfort. Intranasal saline spray may also be used  -Continue the use of fluticasone.  To stop once starts Dymista.  -Oral decongestants.  -Antihistamines.  -Mucolytics such as guaifenesin serve to thin secretions and may promote  ease of mucus drainage and clearance  -     Amoxicillin-Pot Clavulanate; Take 1 tablet by mouth 2 (two) times daily for 7 days.  Dispense: 14 tablet; Refill: 0  -     ENT - INTERNAL  2. History of sinus surgery  Status post balloon sinuplasty in [place] in 2011.  -     ENT - INTERNAL  3. History of chronic sinusitis  -     ENT - INTERNAL          Return if symptoms worsen or fail to improve.    Raza Melendez MD, 5/4/2024, 8:37 AM

## 2024-05-24 DIAGNOSIS — J34.89 SINUS PRESSURE: ICD-10-CM

## 2024-05-24 NOTE — TELEPHONE ENCOUNTER
A refill request was received for:  Requested Prescriptions     Pending Prescriptions Disp Refills    Azelastine-Fluticasone (DYMISTA) 137-50 MCG/ACT Nasal Suspension 3 each 1     Si spray by Nasal route in the morning and 1 spray before bedtime.     Last refill date:  2024 (ordered)  Qty: 3 each - 1 refill  Dx: Sinus pressure  Last office visit: 2024  When is follow up due: (COPIED)Follow up with me via [employer] message in 5-10 days PRN    NOTE:  [facility] is requesting alternative for Azelastin-Fluticasone.  Requesting Flonase due to insurance coverage.     No future appointments.

## 2024-05-28 ENCOUNTER — TELEPHONE (OUTPATIENT)
Dept: FAMILY MEDICINE CLINIC | Facility: CLINIC | Age: 49
End: 2024-05-28

## 2024-05-28 RX ORDER — AZELASTINE HYDROCHLORIDE, FLUTICASONE PROPIONATE 137; 50 UG/1; UG/1
1 SPRAY, METERED NASAL 2 TIMES DAILY
Qty: 3 EACH | Refills: 1 | Status: SHIPPED | OUTPATIENT
Start: 2024-05-28

## 2024-08-30 ENCOUNTER — OFFICE VISIT (OUTPATIENT)
Dept: FAMILY MEDICINE CLINIC | Facility: CLINIC | Age: 49
End: 2024-08-30
Payer: COMMERCIAL

## 2024-08-30 ENCOUNTER — TELEPHONE (OUTPATIENT)
Dept: FAMILY MEDICINE CLINIC | Facility: CLINIC | Age: 49
End: 2024-08-30

## 2024-08-30 VITALS
HEART RATE: 84 BPM | SYSTOLIC BLOOD PRESSURE: 112 MMHG | DIASTOLIC BLOOD PRESSURE: 78 MMHG | HEIGHT: 71 IN | WEIGHT: 174.19 LBS | BODY MASS INDEX: 24.39 KG/M2 | TEMPERATURE: 97 F | OXYGEN SATURATION: 99 %

## 2024-08-30 DIAGNOSIS — J01.81 OTHER ACUTE RECURRENT SINUSITIS: Primary | ICD-10-CM

## 2024-08-30 PROBLEM — K57.30 DIVERTICULOSIS OF LARGE INTESTINE WITHOUT PERFORATION OR ABSCESS WITHOUT BLEEDING: Status: ACTIVE | Noted: 2024-05-29

## 2024-08-30 PROCEDURE — 3074F SYST BP LT 130 MM HG: CPT | Performed by: STUDENT IN AN ORGANIZED HEALTH CARE EDUCATION/TRAINING PROGRAM

## 2024-08-30 PROCEDURE — 3078F DIAST BP <80 MM HG: CPT | Performed by: STUDENT IN AN ORGANIZED HEALTH CARE EDUCATION/TRAINING PROGRAM

## 2024-08-30 PROCEDURE — 3008F BODY MASS INDEX DOCD: CPT | Performed by: STUDENT IN AN ORGANIZED HEALTH CARE EDUCATION/TRAINING PROGRAM

## 2024-08-30 PROCEDURE — 99214 OFFICE O/P EST MOD 30 MIN: CPT | Performed by: STUDENT IN AN ORGANIZED HEALTH CARE EDUCATION/TRAINING PROGRAM

## 2024-08-30 RX ORDER — SULFAMETHOXAZOLE/TRIMETHOPRIM 800-160 MG
1 TABLET ORAL EVERY 12 HOURS
COMMUNITY
Start: 2024-08-26

## 2024-08-30 RX ORDER — SOD SULF/POT CHLORIDE/MAG SULF 1.479 G
24 TABLET ORAL
COMMUNITY
Start: 2024-04-29 | End: 2024-08-30

## 2024-08-30 RX ORDER — PREDNISONE 20 MG/1
20 TABLET ORAL DAILY
Qty: 5 TABLET | Refills: 0 | Status: SHIPPED | OUTPATIENT
Start: 2024-08-30 | End: 2024-09-04

## 2024-08-30 NOTE — TELEPHONE ENCOUNTER
Patient had a colonoscopy on 5/29/24 at [facility].  Patient signed release form & I faxed to [phone].

## 2024-08-30 NOTE — PROGRESS NOTES
Subjective:   Felisa Falcon is a 49 year old female who presents for Sinus Problem (Patient stated on August 8th she was experiencing sinus drainage, started feeling sinus pressure, yellow mucus present & she's feeling worse today. )     49-year-old female complaining of sinus congestion and pressure.  States on 8/8/2024 felt congested and was doing sinus rinses.  Symptoms worsened by 8/21/2024, did a televisit and was prescribed Augmentin.  5 days on antibiotics and felt worse.  She was noting more mucus drainage from left nostril.  Called her provider again and was switched to Bactrim on 8/26/2024.  At this time continues to feel sinus congestion and pressure.  No teeth pain.  Did a home COVID test 2 days ago that was negative.  Currently using Dymista, NeilMed sinus rinses, Mucinex D and ibuprofen.  On 8/21/2024 did 3 days of Afrin as well.  Denies chest pain, SOB, difficulty breathing.    Patient has a history of chronic sinusitis, status post balloon sinuplasty in [place] in 2011.  In March 2024 was treated for sinusitis with Augmentin.  In May 2024 had similar symptoms and was treated again with Augmentin.    History/Other:    Chief Complaint Reviewed and Verified  Nursing Notes Reviewed and   Verified  Tobacco Reviewed  Allergies Reviewed  Medications Reviewed    Problem List Reviewed  Medical History Reviewed  Surgical History   Reviewed  OB Status Reviewed  Family History Reviewed  Social History   Reviewed         Tobacco:  She has never smoked tobacco.    Current Outpatient Medications   Medication Sig Dispense Refill    sulfamethoxazole-trimethoprim -160 MG Oral Tab per tablet Take 1 tablet by mouth Q12H.      predniSONE 20 MG Oral Tab Take 1 tablet (20 mg total) by mouth daily for 5 days. 5 tablet 0    Azelastine-Fluticasone (DYMISTA) 137-50 MCG/ACT Nasal Suspension 1 spray by Nasal route in the morning and 1 spray before bedtime. 3 each 1    Melatonin 10 MG Oral Cap Take 10 mg by  mouth daily.      Multiple Vitamins-Minerals (WOMENS DAILY FORM/FA/CA/FE) Oral Tab Take 1 tablet by mouth daily.      Omega-3 Fatty Acids (OMEGA-3 FISH OIL) 1000 MG Oral Cap Take by mouth daily.      Vitamin D-Vitamin K (DECARA K) 1250-200 MCG Oral Cap Take 1 capsule by mouth daily.      Ferrous Sulfate 324 (65 Fe) MG Oral Tab EC Take 324 mg by mouth daily.           Review of Systems:  Review of Systems   Constitutional:  Negative for chills, diaphoresis and fever.   HENT:  Positive for sinus pressure and sinus pain. Negative for congestion, ear discharge, ear pain and sore throat.    Eyes:  Negative for pain and discharge.   Respiratory:  Negative for cough, chest tightness, shortness of breath and wheezing.    Cardiovascular:  Negative for chest pain and palpitations.   Gastrointestinal:  Negative for abdominal pain, diarrhea, nausea and vomiting.   Endocrine: Negative for cold intolerance and heat intolerance.   Genitourinary:  Negative for dysuria, flank pain, frequency and urgency.   Musculoskeletal:  Negative for joint swelling.   Skin:  Negative for rash.   Neurological:  Negative for dizziness, syncope and headaches.   Psychiatric/Behavioral:  Negative for confusion and hallucinations.        Objective:   /78 (BP Location: Right arm, Patient Position: Sitting, Cuff Size: adult)   Pulse 84   Temp 97.1 °F (36.2 °C) (Temporal)   Ht 5' 11\" (1.803 m)   Wt 174 lb 3.2 oz (79 kg)   LMP 08/28/2024 (Exact Date)   SpO2 99%   BMI 24.30 kg/m²  Estimated body mass index is 24.3 kg/m² as calculated from the following:    Height as of this encounter: 5' 11\" (1.803 m).    Weight as of this encounter: 174 lb 3.2 oz (79 kg).  Physical Exam  Constitutional:       General: She is not in acute distress.     Appearance: Normal appearance. She is not ill-appearing or toxic-appearing.   HENT:      Head: Normocephalic and atraumatic.      Right Ear: Tympanic membrane and ear canal normal.      Left Ear: Tympanic  membrane and ear canal normal.      Nose:      Right Sinus: Maxillary sinus tenderness and frontal sinus tenderness present.      Left Sinus: No maxillary sinus tenderness (Some discomfort.) or frontal sinus tenderness (Some discomfort.).      Mouth/Throat:      Mouth: Mucous membranes are moist.      Pharynx: Oropharynx is clear. No oropharyngeal exudate or posterior oropharyngeal erythema.   Eyes:      Extraocular Movements: Extraocular movements intact.      Pupils: Pupils are equal, round, and reactive to light.   Cardiovascular:      Rate and Rhythm: Normal rate and regular rhythm.      Heart sounds: Normal heart sounds. No murmur heard.     No gallop.   Pulmonary:      Effort: Pulmonary effort is normal. No respiratory distress.      Breath sounds: Normal breath sounds. No stridor. No wheezing, rhonchi or rales.   Abdominal:      General: Bowel sounds are normal.      Palpations: Abdomen is soft.      Tenderness: There is no abdominal tenderness. There is no right CVA tenderness, left CVA tenderness or guarding.   Musculoskeletal:         General: No swelling.      Cervical back: Normal range of motion and neck supple. No rigidity or tenderness.      Right lower leg: No edema.      Left lower leg: No edema.   Skin:     General: Skin is warm and dry.   Neurological:      General: No focal deficit present.      Mental Status: She is alert and oriented to person, place, and time. Mental status is at baseline.      Cranial Nerves: No cranial nerve deficit.      Sensory: No sensory deficit.      Motor: Motor function is intact. No weakness.      Gait: Gait normal.   Psychiatric:         Mood and Affect: Mood normal.         Behavior: Behavior normal.         Thought Content: Thought content normal.         Judgment: Judgment normal.         Assessment & Plan:   1. Other acute recurrent sinusitis (Primary)  Acute on chronic.  -Continue with mechanical irrigation. Intranasal saline spray may also be used  -Continue  with Dymista.  -Oral decongestants.  -Antihistamines.  -Mucolytics such as guaifenesin serve to thin secretions and may promote ease of mucus drainage and clearance  -     predniSONE; Take 1 tablet (20 mg total) by mouth daily for 5 days.  Dispense: 5 tablet; Refill: 0  -     ENT - INTERNAL        Return if symptoms worsen or fail to improve.    Raza Melendez MD, 8/30/2024, 8:09 AM

## 2024-09-03 ENCOUNTER — OFFICE VISIT (OUTPATIENT)
Facility: LOCATION | Age: 49
End: 2024-09-03
Payer: COMMERCIAL

## 2024-09-03 DIAGNOSIS — J34.89 NASAL OBSTRUCTION: ICD-10-CM

## 2024-09-03 DIAGNOSIS — J32.0 CHRONIC MAXILLARY SINUSITIS: Primary | ICD-10-CM

## 2024-09-03 DIAGNOSIS — R09.81 NASAL CONGESTION: ICD-10-CM

## 2024-09-03 DIAGNOSIS — J30.1 ALLERGIC RHINITIS DUE TO POLLEN, UNSPECIFIED SEASONALITY: ICD-10-CM

## 2024-09-03 DIAGNOSIS — J34.2 DEVIATED NASAL SEPTUM: ICD-10-CM

## 2024-09-03 PROCEDURE — 87075 CULTR BACTERIA EXCEPT BLOOD: CPT | Performed by: STUDENT IN AN ORGANIZED HEALTH CARE EDUCATION/TRAINING PROGRAM

## 2024-09-03 PROCEDURE — 99204 OFFICE O/P NEW MOD 45 MIN: CPT | Performed by: STUDENT IN AN ORGANIZED HEALTH CARE EDUCATION/TRAINING PROGRAM

## 2024-09-03 PROCEDURE — 87070 CULTURE OTHR SPECIMN AEROBIC: CPT | Performed by: STUDENT IN AN ORGANIZED HEALTH CARE EDUCATION/TRAINING PROGRAM

## 2024-09-03 PROCEDURE — 87077 CULTURE AEROBIC IDENTIFY: CPT | Performed by: STUDENT IN AN ORGANIZED HEALTH CARE EDUCATION/TRAINING PROGRAM

## 2024-09-03 PROCEDURE — 31231 NASAL ENDOSCOPY DX: CPT | Performed by: STUDENT IN AN ORGANIZED HEALTH CARE EDUCATION/TRAINING PROGRAM

## 2024-09-03 PROCEDURE — 87205 SMEAR GRAM STAIN: CPT | Performed by: STUDENT IN AN ORGANIZED HEALTH CARE EDUCATION/TRAINING PROGRAM

## 2024-09-03 RX ORDER — PREDNISONE 5 MG/1
TABLET ORAL
Qty: 15 TABLET | Refills: 0 | Status: SHIPPED | OUTPATIENT
Start: 2024-09-03 | End: 2024-09-12

## 2024-09-03 RX ORDER — SULFAMETHOXAZOLE/TRIMETHOPRIM 800-160 MG
1 TABLET ORAL 2 TIMES DAILY
Qty: 28 TABLET | Refills: 0 | Status: SHIPPED | OUTPATIENT
Start: 2024-09-03 | End: 2024-09-17

## 2024-09-03 NOTE — PROGRESS NOTES
[facility]  OTOLARYNGOLOGY - HEAD & NECK SURGERY    9/3/2024     Reason for Consultation:   Recurring sinus infections    History of Present Illness:   Patient is a pleasant 49 year old female who is being seen for sinus infections which she has had approximately once a month over the past year.  Patient does have a history of allergic rhinitis and has been on Dymista consistently over the past few years.  She has moved around a little bit and was previously in [place] and [place] where she originally had allergy testing.  She states that these monthly sinus infections have been quite difficult to manage, especially this last sinus infection which began towards the beginning to middle of August. Since the infection started she had been on Augmentin x 7 days as well as Bactrim x 7 days. She did have a stubborn sinus infection in the past and was swabbed by an ENT and treated with Bactrim x 21 days which cleared it for her. She also had balloon sinuplasty in .     Past Medical History  Past Medical History:    Allergic rhinitis       Past Surgical History  Past Surgical History:   Procedure Laterality Date    Hip surgery  2017    Labral repair - Dr. Kimo Mathur    Implantable breast prosthesis      5 years ago    Knee arthroscopy            Other surgical history      sinus surgery       Family History  Family History   Problem Relation Age of Onset    Hypertension Father     Cancer Mother 61        uterine 1A-doing well    Breast Cancer Paternal Aunt 55       Social History  Pediatric History   Patient Parents    Not on file     Other Topics Concern    Caffeine Concern No    Exercise No    Seat Belt Not Asked    Special Diet No    Stress Concern No    Weight Concern No   Social History Narrative    Not on file           Current Medications:  Current Outpatient Medications   Medication Sig Dispense Refill    sulfamethoxazole-trimethoprim -160 MG Oral Tab per tablet Take 1 tablet by mouth 2  (two) times daily for 14 days. 28 tablet 0    predniSONE 5 MG Oral Tab Take 2 tablets (10 mg total) by mouth daily for 5 days, THEN 1 tablet (5 mg total) daily for 5 days. 15 tablet 0    predniSONE 20 MG Oral Tab Take 1 tablet (20 mg total) by mouth daily for 5 days. 5 tablet 0    Azelastine-Fluticasone (DYMISTA) 137-50 MCG/ACT Nasal Suspension 1 spray by Nasal route in the morning and 1 spray before bedtime. (Patient not taking: Reported on 9/3/2024) 3 each 1    Melatonin 10 MG Oral Cap Take 10 mg by mouth daily.      Multiple Vitamins-Minerals (WOMENS DAILY FORM/FA/CA/FE) Oral Tab Take 1 tablet by mouth daily.      Omega-3 Fatty Acids (OMEGA-3 FISH OIL) 1000 MG Oral Cap Take by mouth daily.      Vitamin D-Vitamin K (DECARA K) 1250-200 MCG Oral Cap Take 1 capsule by mouth daily.      Ferrous Sulfate 324 (65 Fe) MG Oral Tab EC Take 324 mg by mouth daily.         Allergies  Allergies   Allergen Reactions    Benzodiazepines NAUSEA AND VOMITING and OTHER (SEE COMMENTS)     Other reaction(s): Nausea and Vomiting    Hard to wake up    Pt states it took several hours to wake up after receiving versed    Benzonatate NAUSEA AND VOMITING       Review of Systems:   A comprehensive 10 point review of systems was completed.  Pertinent positives and negatives noted in the the HPI.    Physical Exam:   Last menstrual period 08/28/2024.    GENERAL: No acute distress, Comfortable appearing  FACE: HB 1/6, Normal Animation  HEAD: Normocephalic  EYES: EOMI, pupils equil  EARS: Bilateral Auricles Symmetric, Bilateral tympanic membranes normal  NOSE: Nares patent bilaterally  ORAL CAVITY: Tongue mobile, Oropharynx clear, Floor of mouth clear, Posterior oropharynx normal  NECK: No palpable lymphadenopathy, thyroid not palpable, nontender    PROCEDURE: BILATERAL RIGID NASAL ENDOSCOPY  Bilateral rigid nasal endoscopy (29505) was performed. Verbal consent was obtained from the patient to proceed with rigid nasal endoscopy.  A rigid 4mm 30  degree nasal endoscope was used to examine both nasal cavities. The inferior meatus, inferior turbinate, nasopharynx, middle meatus, middle turbinate, superior meatus, superior turbinate, and sphenoethmoidal recess were examined bilaterally and deemed to be normal, with any exceptions as noted below. At the completion of the procedure the endoscope was removed. The patient tolerated the procedure well. There were no complications.    Findings: The bilateral inferior turbinates were mildly enlarged. The Septum was deviated to the left posteriorly. The middle meatus was edematous bilaterally with purulent drainage from the left middle meatus which was cultured. There were no obvious masses or polyps noted.    Results:     Laboratory Data:  Lab Results   Component Value Date    WBC 4.0 06/13/2022    HGB 14.2 06/13/2022    HCT 42.3 06/13/2022    .0 06/13/2022    CREATSERUM 0.86 06/13/2022    BUN 15 06/13/2022     06/13/2022    K 4.4 06/13/2022     06/13/2022    CO2 27.0 06/13/2022     (H) 06/13/2022    CA 9.2 06/13/2022    ALB 4.0 06/13/2022    ALKPHO 36 (L) 06/13/2022    TP 7.4 06/13/2022    AST 17 06/13/2022    ALT 28 06/13/2022    TSH 0.960 06/13/2022    B12 951 (H) 01/24/2019         Imaging:  No results found.      Impression:       ICD-10-CM    1. Chronic maxillary sinusitis  J32.0 CT SINUS (CPT=70486)     Aerobic Bacterial Culture     Anaerobic Culture      2. Deviated nasal septum  J34.2       3. Allergic rhinitis due to pollen, unspecified seasonality  J30.1       4. Nasal congestion  R09.81       5. Nasal obstruction  J34.89           Recommendations:  I will treat her with an additional 2 weeks of bactrim and 10 days of prednisone. She will return to see me in 2-3 weeks. If she is still symptomatic I will get CT sinus    Thank you for allowing me to participate in the care of your patient.    Zan Littlejohn, DO   Otolaryngology/Rhinology, Sinus, and Endoscopic Skull Base  Surgery  [facility]   [address] [place] [address] [address] [place] [id number]  Phone [phone]  Fax [phone]  9/3/2024  1:49 PM  9/3/2024

## 2024-09-09 ENCOUNTER — PATIENT MESSAGE (OUTPATIENT)
Facility: LOCATION | Age: 49
End: 2024-09-09

## 2024-09-09 NOTE — TELEPHONE ENCOUNTER
From: Felisa Falcon  To: Zan Littlejohn  Sent: 9/9/2024 9:43 AM CDT  Subject: Culture/follow up    Dr Littlejohn,    I still feel pretty terrible. Everything I am blowing out is still bright yellow and my sinus pressure is no better. I’m curious if there is anything else I can take, as I feel no difference since starting the steroid or antibiotic. Scheduled the CT for the end of the month but happy to move it up if you think that’s best. Thanks. Felisa Falcon

## 2024-09-09 NOTE — TELEPHONE ENCOUNTER
Dr. Littlejohn, patient states she's not any better. Still has bright yellow nasal drainage and sinus pressure.  She has a CT scheduled for the end of the month.  The ABX and steroids haven't seemed to help her, any other suggestions?

## 2024-09-13 NOTE — TELEPHONE ENCOUNTER
Dr. Littlejohn, patient would like a response before the weekend, patient states she's not any better. Still has bright yellow nasal drainage and sinus pressure. She has a CT scheduled for the end of the month. The ABX and steroids haven't seemed to help her, any other suggestions?

## 2024-09-16 RX ORDER — BUDESONIDE 0.5 MG/2ML
INHALANT ORAL
Qty: 60 EACH | Refills: 3 | Status: SHIPPED | OUTPATIENT
Start: 2024-09-16

## 2024-09-20 ENCOUNTER — PATIENT MESSAGE (OUTPATIENT)
Dept: CASE MANAGEMENT | Age: 49
End: 2024-09-20

## 2024-10-02 ENCOUNTER — HOSPITAL ENCOUNTER (OUTPATIENT)
Dept: CT IMAGING | Facility: HOSPITAL | Age: 49
Discharge: HOME | End: 2024-10-02
Attending: STUDENT IN AN ORGANIZED HEALTH CARE EDUCATION/TRAINING PROGRAM
Payer: COMMERCIAL

## 2024-10-02 DIAGNOSIS — J32.0 CHRONIC MAXILLARY SINUSITIS: ICD-10-CM

## 2024-10-02 PROCEDURE — 70486 CT MAXILLOFACIAL W/O DYE: CPT | Performed by: STUDENT IN AN ORGANIZED HEALTH CARE EDUCATION/TRAINING PROGRAM

## 2024-10-04 RX ORDER — BUDESONIDE 0.5 MG/2ML
INHALANT ORAL
Qty: 360 ML | Refills: 1 | Status: SHIPPED | OUTPATIENT
Start: 2024-10-04

## 2024-10-04 NOTE — TELEPHONE ENCOUNTER
This refill request is being sent to the provider for the following reason:  []Patient has not had an appointment within the past 12 months but has made an appointment on: ___  [x]Medication is not within protocol - Budesonide  []Patient did not complete follow up recommendations  []Other: ___    Last office visit was:  9/3/24

## 2024-10-11 ENCOUNTER — OFFICE VISIT (OUTPATIENT)
Facility: LOCATION | Age: 49
End: 2024-10-11
Payer: COMMERCIAL

## 2024-10-11 DIAGNOSIS — R09.81 NASAL CONGESTION: ICD-10-CM

## 2024-10-11 DIAGNOSIS — J34.89 NASAL OBSTRUCTION: ICD-10-CM

## 2024-10-11 DIAGNOSIS — J34.3 HYPERTROPHY OF NASAL TURBINATES: ICD-10-CM

## 2024-10-11 DIAGNOSIS — J32.4 CHRONIC PANSINUSITIS: Primary | ICD-10-CM

## 2024-10-11 DIAGNOSIS — J34.2 DEVIATED NASAL SEPTUM: ICD-10-CM

## 2024-10-11 PROCEDURE — 99214 OFFICE O/P EST MOD 30 MIN: CPT | Performed by: STUDENT IN AN ORGANIZED HEALTH CARE EDUCATION/TRAINING PROGRAM

## 2024-10-11 PROCEDURE — 31231 NASAL ENDOSCOPY DX: CPT | Performed by: STUDENT IN AN ORGANIZED HEALTH CARE EDUCATION/TRAINING PROGRAM

## 2024-10-13 NOTE — PROGRESS NOTES
[facility]  OTOLARYNGOLOGY - HEAD & NECK SURGERY    10/11/2024     Reason for Consultation:   Recurring sinus infections    History of Present Illness:   Patient is a pleasant 49 year old female who is being seen for sinus infections which she has had approximately once a month over the past year.  Patient does have a history of allergic rhinitis and has been on Dymista consistently over the past few years.  She has moved around a little bit and was previously in [place] and [place] where she originally had allergy testing.  She states that these monthly sinus infections have been quite difficult to manage, especially this last sinus infection which began towards the beginning to middle of August. Since the infection started she had been on Augmentin x 7 days as well as Bactrim x 7 days. She did have a stubborn sinus infection in the past and was swabbed by an ENT and treated with Bactrim x 21 days which cleared it for her. She also had balloon sinuplasty in .     INTERVAL HISTORY  10/12/2024: Felisa returns today after trying budesonide rinses and after her multiple courses of antibiotics, and continues to have significant issues with postnasal drip, facial pressure, and congestion.  She had CT sinus performed and is here today to review the CT sinus.    Past Medical History  Past Medical History:    Allergic rhinitis       Past Surgical History  Past Surgical History:   Procedure Laterality Date    Hip surgery  2017    Labral repair - Dr. Kimo Mathur    Implantable breast prosthesis      5 years ago    Knee arthroscopy            Other surgical history      sinus surgery       Family History  Family History   Problem Relation Age of Onset    Hypertension Father     Cancer Mother 61        uterine 1A-doing well    Breast Cancer Paternal Aunt 55       Social History  Pediatric History   Patient Parents    Not on file     Other Topics Concern    Caffeine Concern No    Exercise No    Seat Belt Not  Asked    Special Diet No    Stress Concern No    Weight Concern No   Social History Narrative    Not on file           Current Medications:  Current Outpatient Medications   Medication Sig Dispense Refill    BUDESONIDE 0.5 MG/2ML Inhalation Suspension ADD TO NEILMED SINUS RINSE BOTTLE ALONG WITH DISTILLED WATER TO THE LINE. USE HALF THE BOTTLE TO IRRIGATE ONE NASAL CAVITY, AND THE OTHER HALF TO IRRIGATE THE OTHER NASAL CAVITY. DO THIS TWICE A  mL 1    Azelastine-Fluticasone (DYMISTA) 137-50 MCG/ACT Nasal Suspension 1 spray by Nasal route in the morning and 1 spray before bedtime. 3 each 1    Melatonin 10 MG Oral Cap Take 10 mg by mouth daily.      Multiple Vitamins-Minerals (WOMENS DAILY FORM/FA/CA/FE) Oral Tab Take 1 tablet by mouth daily.      Omega-3 Fatty Acids (OMEGA-3 FISH OIL) 1000 MG Oral Cap Take by mouth daily.      Vitamin D-Vitamin K (DECARA K) 1250-200 MCG Oral Cap Take 1 capsule by mouth daily.      Ferrous Sulfate 324 (65 Fe) MG Oral Tab EC Take 324 mg by mouth daily.         Allergies  Allergies   Allergen Reactions    Benzodiazepines NAUSEA AND VOMITING and OTHER (SEE COMMENTS)     Other reaction(s): Nausea and Vomiting    Hard to wake up    Pt states it took several hours to wake up after receiving versed    Benzonatate NAUSEA AND VOMITING       Review of Systems:   A comprehensive 10 point review of systems was completed.  Pertinent positives and negatives noted in the the HPI.    Physical Exam:   Last menstrual period 08/28/2024.    GENERAL: No acute distress, Comfortable appearing  FACE: HB 1/6, Normal Animation  HEAD: Normocephalic  EYES: EOMI, pupils equil  EARS: Bilateral Auricles Symmetric, Bilateral tympanic membranes normal  NOSE: Nares patent bilaterally  ORAL CAVITY: Tongue mobile, Oropharynx clear, Floor of mouth clear, Posterior oropharynx normal  NECK: No palpable lymphadenopathy, thyroid not palpable, nontender    PROCEDURE: BILATERAL RIGID NASAL ENDOSCOPY -as performed on  10/11/2024  Bilateral rigid nasal endoscopy (53103) was performed. Verbal consent was obtained from the patient to proceed with rigid nasal endoscopy.  A rigid 4mm 30 degree nasal endoscope was used to examine both nasal cavities. The inferior meatus, inferior turbinate, nasopharynx, middle meatus, middle turbinate, superior meatus, superior turbinate, and sphenoethmoidal recess were examined bilaterally and deemed to be normal, with any exceptions as noted below. At the completion of the procedure the endoscope was removed. The patient tolerated the procedure well. There were no complications.    Findings: The bilateral inferior turbinates were mildly enlarged. The Septum was deviated to the left posteriorly. The middle meatus was edematous bilaterally with persistent purulent drainage from the left middle meatus, similar to last exam. There were no obvious masses or polyps noted.    Results:     Laboratory Data:  Lab Results   Component Value Date    WBC 4.0 06/13/2022    HGB 14.2 06/13/2022    HCT 42.3 06/13/2022    .0 06/13/2022    CREATSERUM 0.86 06/13/2022    BUN 15 06/13/2022     06/13/2022    K 4.4 06/13/2022     06/13/2022    CO2 27.0 06/13/2022     (H) 06/13/2022    CA 9.2 06/13/2022    ALB 4.0 06/13/2022    ALKPHO 36 (L) 06/13/2022    TP 7.4 06/13/2022    AST 17 06/13/2022    ALT 28 06/13/2022    TSH 0.960 06/13/2022    B12 951 (H) 01/24/2019         Imaging:  No results found.      Impression:       ICD-10-CM    1. Chronic maxillary sinusitis  J32.0 CT SINUS (CPT=70486)     Aerobic Bacterial Culture     Anaerobic Culture      2. Deviated nasal septum  J34.2       3. Allergic rhinitis due to pollen, unspecified seasonality  J30.1       4. Nasal congestion  R09.81       5. Nasal obstruction  J34.89           Recommendations:  I had a lengthy discussion with the patient regarding her CT sinus.  It appears she has pansinusitis.  I discussed surgical and nonsurgical options.  I did  discuss with her that we have tried many of the conservative options including medical therapy and budesonide rinses and she continues to feel the same.  I told her that she is a good candidate for endoscopic sinus surgery.  I discussed risks and benefits of surgery.  I also discussed with her that she would have to be on budesonide rinses following surgery for the best chance of a good result.    The patient continues to have symptoms and imaging consistent of chronic sinusitis, despite appropriate medical therapy. The patient would likely benefit from Bilateral maxillary antrostomy, Bilateral total ethmoidectomy, Bilateral sphenoidotomy, Bilateral frontal sinusotomy, Septoplasty, and bilateral inferior turbinate submucosal resection, Image guidance. I have discussed with the patient the risks and benefits of the surgery, as well as what to expect after surgery. I have additionally provided them a comprehensive handout listing the possible risks, and important aspects of postoperative care. The patient will review the handout, and I have made myself available to answer any questions or concerns prior to surgery.    Thank you for allowing me to participate in the care of your patient.    Zan Littlejohn, DO   Otolaryngology/Rhinology, Sinus, and Endoscopic Skull Base Surgery  [facility]   [address] [place] [address] [address] [place] [id number]  Phone [phone]  Fax [phone]  9/3/2024  1:49 PM  10/11/2024

## 2024-12-29 DIAGNOSIS — J34.89 SINUS PRESSURE: ICD-10-CM

## 2025-01-03 NOTE — TELEPHONE ENCOUNTER
A refill request was received for:  Requested Prescriptions     Pending Prescriptions Disp Refills    AZELASTINE-FLUTICASONE 137-50 MCG/ACT Nasal Suspension [Pharmacy Med Name: AZELASTIN-FLUTIC 137-50MCG [facility]]  1     Sig: SPRAY 1 SPRAY NASALLY IN THE MORNING AND AT BEDTIME     Last refill date:  5/28/24   Qty: 3 each and 1   Dx: sinus pressure   Last office visit:  8/30/24   When is follow up due: not listed

## 2025-01-04 RX ORDER — AZELASTINE HYDROCHLORIDE, FLUTICASONE PROPIONATE 137; 50 UG/1; UG/1
SPRAY, METERED NASAL
Qty: 23 G | Refills: 1 | Status: SHIPPED | OUTPATIENT
Start: 2025-01-04